# Patient Record
Sex: MALE | Race: OTHER | NOT HISPANIC OR LATINO | URBAN - METROPOLITAN AREA
[De-identification: names, ages, dates, MRNs, and addresses within clinical notes are randomized per-mention and may not be internally consistent; named-entity substitution may affect disease eponyms.]

---

## 2023-05-31 ENCOUNTER — OFFICE VISIT (OUTPATIENT)
Dept: PRIMARY CARE | Facility: CLINIC | Age: 63
End: 2023-05-31
Payer: COMMERCIAL

## 2023-05-31 VITALS
OXYGEN SATURATION: 97 % | DIASTOLIC BLOOD PRESSURE: 62 MMHG | RESPIRATION RATE: 18 BRPM | TEMPERATURE: 98.3 F | HEART RATE: 69 BPM | SYSTOLIC BLOOD PRESSURE: 108 MMHG | WEIGHT: 170 LBS | HEIGHT: 64 IN | BODY MASS INDEX: 29.02 KG/M2

## 2023-05-31 DIAGNOSIS — L72.0 INCLUSION CYST: ICD-10-CM

## 2023-05-31 DIAGNOSIS — Z12.5 PROSTATE CANCER SCREENING: ICD-10-CM

## 2023-05-31 DIAGNOSIS — F17.210 HEAVY CIGARETTE SMOKER: ICD-10-CM

## 2023-05-31 DIAGNOSIS — Z13.220 LIPID SCREENING: Primary | ICD-10-CM

## 2023-05-31 DIAGNOSIS — Z12.11 COLON CANCER SCREENING: ICD-10-CM

## 2023-05-31 DIAGNOSIS — Z00.00 HEALTH CARE MAINTENANCE: ICD-10-CM

## 2023-05-31 PROBLEM — Z20.822 SUSPECTED COVID-19 VIRUS INFECTION: Status: RESOLVED | Noted: 2023-05-31 | Resolved: 2023-05-31

## 2023-05-31 PROBLEM — M79.642 HAND PAIN, NOT ARTHRALGIA, LEFT: Status: RESOLVED | Noted: 2023-05-31 | Resolved: 2023-05-31

## 2023-05-31 PROBLEM — S62.307A FRACTURE OF FIFTH METACARPAL BONE OF LEFT HAND: Status: RESOLVED | Noted: 2023-05-31 | Resolved: 2023-05-31

## 2023-05-31 PROBLEM — R05.9 COUGH: Status: RESOLVED | Noted: 2023-05-31 | Resolved: 2023-05-31

## 2023-05-31 PROBLEM — B34.9 VIRAL ILLNESS: Status: RESOLVED | Noted: 2023-05-31 | Resolved: 2023-05-31

## 2023-05-31 PROCEDURE — 99204 OFFICE O/P NEW MOD 45 MIN: CPT | Performed by: FAMILY MEDICINE

## 2023-05-31 PROCEDURE — 10061 I&D ABSCESS COMP/MULTIPLE: CPT | Performed by: FAMILY MEDICINE

## 2023-05-31 RX ORDER — UMECLIDINIUM BROMIDE AND VILANTEROL TRIFENATATE 62.5; 25 UG/1; UG/1
1 POWDER RESPIRATORY (INHALATION) DAILY
COMMUNITY

## 2023-05-31 ASSESSMENT — ENCOUNTER SYMPTOMS
HEADACHES: 0
VOMITING: 0
PALPITATIONS: 0
DIZZINESS: 0
NERVOUS/ANXIOUS: 0
BACK PAIN: 0
CONSTIPATION: 0
MYALGIAS: 0
DIARRHEA: 0
ROS SKIN COMMENTS: SEE HPI
DYSPHORIC MOOD: 0
COUGH: 0
NAUSEA: 0
FEVER: 0
CHILLS: 0
SHORTNESS OF BREATH: 0
ARTHRALGIAS: 0
WEAKNESS: 0
ABDOMINAL PAIN: 0
FATIGUE: 0

## 2023-05-31 NOTE — PROGRESS NOTES
"Subjective   Patient ID: Leonid Molina is a 63 y.o. male who presents for New Patient Visit (New patient present today to establish. ).    Presents with wife to establish care, has not been receiving regular healthcare. Is on anoro inhaler for COPD. Is a pack a day smoker for 45 years, declines smoking cessation today. Has never had lung cancer screening but does wish to have this ordered. Denies any hempotysis, shortness of breath, cough, change in weight. Does not have any routine blood work on file. Is not up to date with routine health screenings. Does not have any known family history of colon cancer. Does have occasional blood in stool likely due to hemmerhoids but has not had any recently. Discussed colon cancer screening today and patient does agree to colonoscopy for screening purposes.     Does have concerns of a growth on chest that has been progressively getting larger over the last several years. Is nontender, dome shaped, mobile and consistency of inclusion cyst. Has attempted to drain lump but has been unsuccessful.             Review of Systems   Constitutional:  Negative for chills, fatigue and fever.   Eyes:  Negative for visual disturbance.   Respiratory:  Negative for cough and shortness of breath.    Cardiovascular:  Negative for chest pain and palpitations.   Gastrointestinal:  Negative for abdominal pain, constipation, diarrhea, nausea and vomiting.   Musculoskeletal:  Negative for arthralgias, back pain and myalgias.   Skin:  Negative for rash.        See HPI   Neurological:  Negative for dizziness, syncope, weakness and headaches.   Psychiatric/Behavioral:  Negative for dysphoric mood. The patient is not nervous/anxious.        Objective   /62   Pulse 69   Temp 36.8 °C (98.3 °F) (Temporal)   Resp 18   Ht 1.626 m (5' 4\")   Wt 77.1 kg (170 lb)   SpO2 97%   BMI 29.18 kg/m²     Physical Exam  Constitutional:       Appearance: Normal appearance.   HENT:      Head: Normocephalic " and atraumatic.   Cardiovascular:      Rate and Rhythm: Normal rate and regular rhythm.      Heart sounds: No murmur heard.     No gallop.   Pulmonary:      Effort: Pulmonary effort is normal. Prolonged expiration present. No respiratory distress.      Breath sounds: Examination of the right-middle field reveals wheezing. Examination of the left-middle field reveals wheezing. Examination of the right-lower field reveals wheezing. Examination of the left-lower field reveals wheezing. Wheezing present.   Abdominal:      General: Bowel sounds are normal. There is no distension.      Tenderness: There is no abdominal tenderness.   Musculoskeletal:         General: Normal range of motion.   Skin:     General: Skin is warm and dry.      Findings: No lesion or rash.             Comments: Approximately 2 inch in diameter inclusion cyst of mid chest   Neurological:      General: No focal deficit present.      Mental Status: He is alert and oriented to person, place, and time. Mental status is at baseline.   Psychiatric:         Mood and Affect: Mood normal.         Behavior: Behavior normal.   Patient ID: Leonid Molina is a 63 y.o. male.    Incision and Drainage    Date/Time: 5/31/2023 9:40 PM    Performed by: ZENAIDA Arango  Authorized by: ZENAIDA Arango    Consent:     Consent obtained:  Verbal    Consent given by:  Patient    Risks, benefits, and alternatives were discussed: yes      Risks discussed:  Bleeding, incomplete drainage, pain and infection    Alternatives discussed:  No treatment, observation and referral  Universal protocol:     Procedure explained and questions answered to patient or proxy's satisfaction: yes      Immediately prior to procedure, a time out was called: yes      Patient identity confirmed:  Verbally with patient  Location:     Type:  Cyst    Location: chest.  Pre-procedure details:     Skin preparation:  Povidone-iodine  Anesthesia:     Anesthesia method:   Topical application  Procedure type:     Complexity:  Complex  Procedure details:     Incision types:  Single straight    Incision depth:  Dermal    Wound management:  Probed and deloculated    Drainage characteristics: white curd-like drainage with serosanguinous fluid.    Drainage amount:  Moderate    Wound treatment:  Wound left open    Packing materials:  Wick placed  Post-procedure details:     Procedure completion:  Tolerated well, no immediate complications      Assessment/Plan   Problem List Items Addressed This Visit    None

## 2023-06-02 ENCOUNTER — OFFICE VISIT (OUTPATIENT)
Dept: PRIMARY CARE | Facility: CLINIC | Age: 63
End: 2023-06-02
Payer: COMMERCIAL

## 2023-06-02 VITALS
BODY MASS INDEX: 26.97 KG/M2 | SYSTOLIC BLOOD PRESSURE: 130 MMHG | HEIGHT: 66 IN | DIASTOLIC BLOOD PRESSURE: 70 MMHG | WEIGHT: 167.8 LBS | TEMPERATURE: 98.3 F | HEART RATE: 80 BPM | OXYGEN SATURATION: 97 % | RESPIRATION RATE: 17 BRPM

## 2023-06-02 DIAGNOSIS — Z51.89 ENCOUNTER FOR WOUND CARE: Primary | ICD-10-CM

## 2023-06-02 PROCEDURE — 99024 POSTOP FOLLOW-UP VISIT: CPT | Performed by: FAMILY MEDICINE

## 2023-06-02 ASSESSMENT — ENCOUNTER SYMPTOMS
ROS SKIN COMMENTS: SEE HPI
FEVER: 0
CHILLS: 0

## 2023-06-02 NOTE — PROGRESS NOTES
"Subjective   Patient ID: Leonid Molina is a 63 y.o. male who presents for Cyst (Packing removal).    I&D of inclusion cyst  two days ago requiring packing. Presents today for removal. Wound bed is healing well and is [id number], will repack today. No s/s infection on exam.         Review of Systems   Constitutional:  Negative for chills and fever.   Skin:         See HPI       Objective   /70   Pulse 80   Temp 36.8 °C (98.3 °F) (Temporal)   Resp 17   Ht 1.676 m (5' 6\")   Wt 76.1 kg (167 lb 12.8 oz)   SpO2 97%   BMI 27.08 kg/m²     Physical Exam  Constitutional:       Appearance: Normal appearance.   Skin:     Comments: Mid chest incision approximately [id number] in diameter, moist red wound bed with serosanguinous drainage.        Assessment/Plan          "

## 2023-06-07 ENCOUNTER — TELEPHONE (OUTPATIENT)
Dept: PRIMARY CARE | Facility: CLINIC | Age: 63
End: 2023-06-07
Payer: COMMERCIAL

## 2023-06-07 NOTE — TELEPHONE ENCOUNTER
Pt has had cramps and diarrhea, black in color, since Sun. Getting concerned because it is not letting up, thinks it may be food poisoning from a bad hamburger. Please advise.

## 2023-12-14 ENCOUNTER — HOSPITAL ENCOUNTER (OUTPATIENT)
Dept: RADIOLOGY | Facility: EXTERNAL LOCATION | Age: 63
Discharge: HOME | End: 2023-12-14

## 2023-12-14 DIAGNOSIS — M25.511 RIGHT SHOULDER PAIN, UNSPECIFIED CHRONICITY: ICD-10-CM
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
## 2023-12-14 DIAGNOSIS — M54.50 LOW BACK PAIN WITHOUT SCIATICA, UNSPECIFIED BACK PAIN LATERALITY, UNSPECIFIED CHRONICITY: ICD-10-CM

## 2023-12-20 ENCOUNTER — APPOINTMENT (OUTPATIENT)
Dept: RADIOLOGY | Facility: HOSPITAL | Age: 63
End: 2023-12-20
Payer: COMMERCIAL

## 2023-12-20 ENCOUNTER — HOSPITAL ENCOUNTER (EMERGENCY)
Facility: HOSPITAL | Age: 63
Discharge: HOME | End: 2023-12-20
Payer: COMMERCIAL

## 2023-12-20 VITALS
RESPIRATION RATE: 18 BRPM | OXYGEN SATURATION: 97 % | WEIGHT: 165 LBS | BODY MASS INDEX: 26.52 KG/M2 | SYSTOLIC BLOOD PRESSURE: 164 MMHG | HEART RATE: 81 BPM | TEMPERATURE: 98 F | DIASTOLIC BLOOD PRESSURE: 90 MMHG | HEIGHT: 66 IN

## 2023-12-20 DIAGNOSIS — R51.9 ACUTE NONINTRACTABLE HEADACHE, UNSPECIFIED HEADACHE TYPE: ICD-10-CM

## 2023-12-20 DIAGNOSIS — S16.1XXA CERVICAL STRAIN, ACUTE, INITIAL ENCOUNTER: Primary | ICD-10-CM

## 2023-12-20 DIAGNOSIS — S46.911A STRAIN OF RIGHT SHOULDER, INITIAL ENCOUNTER: ICD-10-CM

## 2023-12-20 PROCEDURE — 70450 CT HEAD/BRAIN W/O DYE: CPT

## 2023-12-20 PROCEDURE — 2500000004 HC RX 250 GENERAL PHARMACY W/ HCPCS (ALT 636 FOR OP/ED): Performed by: NURSE PRACTITIONER

## 2023-12-20 PROCEDURE — 72125 CT NECK SPINE W/O DYE: CPT

## 2023-12-20 PROCEDURE — 70450 CT HEAD/BRAIN W/O DYE: CPT | Performed by: RADIOLOGY

## 2023-12-20 PROCEDURE — 72125 CT NECK SPINE W/O DYE: CPT | Performed by: RADIOLOGY

## 2023-12-20 PROCEDURE — 99285 EMERGENCY DEPT VISIT HI MDM: CPT

## 2023-12-20 PROCEDURE — 96372 THER/PROPH/DIAG INJ SC/IM: CPT

## 2023-12-20 RX ORDER — NAPROXEN 500 MG/1
500 TABLET ORAL
Qty: 14 TABLET | Refills: 0 | Status: SHIPPED | OUTPATIENT
Start: 2023-12-20 | End: 2023-12-27

## 2023-12-20 RX ORDER — KETOROLAC TROMETHAMINE 30 MG/ML
30 INJECTION, SOLUTION INTRAMUSCULAR; INTRAVENOUS ONCE
Status: COMPLETED | OUTPATIENT
Start: 2023-12-20 | End: 2023-12-20

## 2023-12-20 RX ORDER — METHOCARBAMOL 500 MG/1
500 TABLET, FILM COATED ORAL 2 TIMES DAILY
Qty: 20 TABLET | Refills: 0 | Status: SHIPPED | OUTPATIENT
Start: 2023-12-20 | End: 2023-12-30

## 2023-12-20 RX ADMIN — KETOROLAC TROMETHAMINE 30 MG: 30 INJECTION, SOLUTION INTRAMUSCULAR at 12:12

## 2023-12-20 ASSESSMENT — COLUMBIA-SUICIDE SEVERITY RATING SCALE - C-SSRS
1. IN THE PAST MONTH, HAVE YOU WISHED YOU WERE DEAD OR WISHED YOU COULD GO TO SLEEP AND NOT WAKE UP?: NO
6. HAVE YOU EVER DONE ANYTHING, STARTED TO DO ANYTHING, OR PREPARED TO DO ANYTHING TO END YOUR LIFE?: NO
2. HAVE YOU ACTUALLY HAD ANY THOUGHTS OF KILLING YOURSELF?: NO

## 2023-12-20 ASSESSMENT — LIFESTYLE VARIABLES
EVER HAD A DRINK FIRST THING IN THE MORNING TO STEADY YOUR NERVES TO GET RID OF A HANGOVER: NO
HAVE YOU EVER FELT YOU SHOULD CUT DOWN ON YOUR DRINKING: NO
HAVE PEOPLE ANNOYED YOU BY CRITICIZING YOUR DRINKING: NO
EVER FELT BAD OR GUILTY ABOUT YOUR DRINKING: NO
REASON UNABLE TO ASSESS: NO
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
## 2023-12-22 ENCOUNTER — TELEPHONE (OUTPATIENT)
Dept: PRIMARY CARE | Facility: CLINIC | Age: 63
End: 2023-12-22
Payer: COMMERCIAL

## 2023-12-22 NOTE — TELEPHONE ENCOUNTER
Peggy Beltran, STEVE-LUAN Menezes MA  Caller: Unspecified (Today,  2:10 PM)  We are not in tomorrow and I do not have availability until next Friday, please see if he would like to schedule this.
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
## 2024-01-19 ENCOUNTER — OFFICE VISIT (OUTPATIENT)
Dept: PRIMARY CARE | Facility: CLINIC | Age: 64
End: 2024-01-19
Payer: COMMERCIAL

## 2024-01-19 VITALS
WEIGHT: 165 LBS | DIASTOLIC BLOOD PRESSURE: 82 MMHG | BODY MASS INDEX: 26.52 KG/M2 | HEIGHT: 66 IN | SYSTOLIC BLOOD PRESSURE: 132 MMHG

## 2024-01-19 DIAGNOSIS — N52.9 ERECTILE DYSFUNCTION, UNSPECIFIED ERECTILE DYSFUNCTION TYPE: ICD-10-CM

## 2024-01-19 DIAGNOSIS — S16.1XXS CERVICAL STRAIN, ACUTE, SEQUELA: Primary | ICD-10-CM

## 2024-01-19 DIAGNOSIS — M25.511 ACUTE PAIN OF RIGHT SHOULDER: ICD-10-CM
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
## 2024-01-19 DIAGNOSIS — S39.012S LUMBAR STRAIN, SEQUELA: ICD-10-CM

## 2024-01-19 PROBLEM — Z20.822 CONTACT WITH AND (SUSPECTED) EXPOSURE TO COVID-19: Status: RESOLVED | Noted: 2021-12-19 | Resolved: 2024-01-19

## 2024-01-19 PROBLEM — F17.200 NICOTINE DEPENDENCE: Status: RESOLVED | Noted: 2024-01-19 | Resolved: 2024-01-19

## 2024-01-19 PROBLEM — L72.0 EPIDERMOID CYST: Status: RESOLVED | Noted: 2024-01-19 | Resolved: 2024-01-19

## 2024-01-19 PROBLEM — R05.9 COUGH, UNSPECIFIED: Status: ACTIVE | Noted: 2021-12-19
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
## 2024-01-19 PROBLEM — M54.50 LOW BACK PAIN: Status: RESOLVED | Noted: 2023-12-14 | Resolved: 2024-01-19

## 2024-01-19 PROBLEM — S16.1XXA STRAIN OF NECK MUSCLE: Status: RESOLVED | Noted: 2024-01-19 | Resolved: 2024-01-19

## 2024-01-19 PROBLEM — R51.9 ACUTE HEADACHE: Status: RESOLVED | Noted: 2024-01-19 | Resolved: 2024-01-19

## 2024-01-19 PROBLEM — F17.210 HEAVY CIGARETTE SMOKER: Status: RESOLVED | Noted: 2020-11-03 | Resolved: 2024-01-19

## 2024-01-19 PROCEDURE — 99214 OFFICE O/P EST MOD 30 MIN: CPT | Performed by: FAMILY MEDICINE

## 2024-01-19 RX ORDER — SILDENAFIL 50 MG/1
50 TABLET, FILM COATED ORAL DAILY PRN
Qty: 12 TABLET | Refills: 3 | Status: SHIPPED | OUTPATIENT
Start: 2024-01-19 | End: 2025-01-18

## 2024-01-19 RX ORDER — PREDNISONE 20 MG/1
TABLET ORAL
Qty: 18 TABLET | Refills: 0 | Status: SHIPPED | OUTPATIENT
Start: 2024-01-19

## 2024-01-19 ASSESSMENT — ENCOUNTER SYMPTOMS
DEPRESSION: 0
OCCASIONAL FEELINGS OF UNSTEADINESS: 0
LOSS OF SENSATION IN FEET: 0

## 2024-01-19 ASSESSMENT — PATIENT HEALTH QUESTIONNAIRE - PHQ9
SUM OF ALL RESPONSES TO PHQ9 QUESTIONS 1 AND 2: 0
2. FEELING DOWN, DEPRESSED OR HOPELESS: NOT AT ALL
1. LITTLE INTEREST OR PLEASURE IN DOING THINGS: NOT AT ALL
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
## 2024-01-23 ENCOUNTER — APPOINTMENT (OUTPATIENT)
Dept: ORTHOPEDIC SURGERY | Facility: CLINIC | Age: 64
End: 2024-01-23
Payer: COMMERCIAL

## 2024-01-31 ENCOUNTER — OFFICE VISIT (OUTPATIENT)
Dept: ORTHOPEDIC SURGERY | Facility: CLINIC | Age: 64
End: 2024-01-31
Payer: COMMERCIAL

## 2024-01-31 VITALS — WEIGHT: 165 LBS | HEIGHT: 66 IN | BODY MASS INDEX: 26.52 KG/M2

## 2024-01-31 DIAGNOSIS — M75.81 ROTATOR CUFF TENDONITIS, RIGHT: ICD-10-CM

## 2024-01-31 DIAGNOSIS — M75.41 IMPINGEMENT SYNDROME OF RIGHT SHOULDER: ICD-10-CM

## 2024-01-31 DIAGNOSIS — M19.019 ACUTE DEGENERATIVE JOINT DISEASE OF SHOULDER REGION: Primary | ICD-10-CM

## 2024-01-31 DIAGNOSIS — M75.51 BURSITIS OF RIGHT SHOULDER: ICD-10-CM

## 2024-01-31 DIAGNOSIS — M25.511 ACUTE PAIN OF RIGHT SHOULDER: ICD-10-CM

## 2024-01-31 PROCEDURE — 99204 OFFICE O/P NEW MOD 45 MIN: CPT | Performed by: ORTHOPAEDIC SURGERY

## 2024-01-31 PROCEDURE — 20610 DRAIN/INJ JOINT/BURSA W/O US: CPT | Performed by: ORTHOPAEDIC SURGERY

## 2024-01-31 RX ORDER — LIDOCAINE HYDROCHLORIDE 10 MG/ML
1 INJECTION INFILTRATION; PERINEURAL
Status: COMPLETED | OUTPATIENT
Start: 2024-01-31 | End: 2024-01-31

## 2024-01-31 RX ORDER — TRIAMCINOLONE ACETONIDE 40 MG/ML
40 INJECTION, SUSPENSION INTRA-ARTICULAR; INTRAMUSCULAR
Status: COMPLETED | OUTPATIENT
Start: 2024-01-31 | End: 2024-01-31

## 2024-01-31 RX ORDER — MELOXICAM 15 MG/1
15 TABLET ORAL DAILY
Qty: 30 TABLET | Refills: 1 | Status: SHIPPED | OUTPATIENT
Start: 2024-01-31 | End: 2024-03-31

## 2024-01-31 RX ADMIN — LIDOCAINE HYDROCHLORIDE 1 ML: 10 INJECTION INFILTRATION; PERINEURAL at 14:49

## 2024-01-31 RX ADMIN — TRIAMCINOLONE ACETONIDE 40 MG: 40 INJECTION, SUSPENSION INTRA-ARTICULAR; INTRAMUSCULAR at 14:49
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
## 2024-03-26 ENCOUNTER — TELEPHONE (OUTPATIENT)
Dept: PRIMARY CARE | Facility: CLINIC | Age: 64
End: 2024-03-26
Payer: COMMERCIAL

## 2024-03-27 NOTE — TELEPHONE ENCOUNTER
There are no discount cards in the office. Notified patient, the patient tried good rx but it was still $500

## 2024-04-24 ENCOUNTER — OFFICE VISIT (OUTPATIENT)
Dept: ORTHOPEDIC SURGERY | Facility: CLINIC | Age: 64
End: 2024-04-24
Payer: COMMERCIAL

## 2024-04-24 VITALS — BODY MASS INDEX: 26.52 KG/M2 | HEIGHT: 66 IN | WEIGHT: 165 LBS

## 2024-04-24 DIAGNOSIS — M75.51 BURSITIS OF RIGHT SHOULDER: ICD-10-CM

## 2024-04-24 DIAGNOSIS — M75.81 ROTATOR CUFF TENDONITIS, RIGHT: Primary | ICD-10-CM

## 2024-04-24 DIAGNOSIS — M75.41 IMPINGEMENT SYNDROME OF RIGHT SHOULDER: ICD-10-CM

## 2024-04-24 PROCEDURE — 99214 OFFICE O/P EST MOD 30 MIN: CPT | Performed by: ORTHOPAEDIC SURGERY

## 2024-04-24 ASSESSMENT — PAIN - FUNCTIONAL ASSESSMENT: PAIN_FUNCTIONAL_ASSESSMENT: 0-10

## 2024-04-24 ASSESSMENT — PAIN SCALES - GENERAL: PAINLEVEL_OUTOF10: 4

## 2024-04-24 NOTE — PROGRESS NOTES
Follow up Right shoulder pain from 12/13/2023.  Last injected 01/31/2024.  He also completed 14 weeks of physical therapy which helped until he pulled the string on his  2 weeks ago and felt pain.  He now has pain again with movement.
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
## 2024-05-29 ENCOUNTER — APPOINTMENT (OUTPATIENT)
Dept: ORTHOPEDIC SURGERY | Facility: CLINIC | Age: 64
End: 2024-05-29
Payer: COMMERCIAL

## 2024-05-29 ENCOUNTER — OFFICE VISIT (OUTPATIENT)
Dept: ORTHOPEDIC SURGERY | Facility: CLINIC | Age: 64
End: 2024-05-29
Payer: COMMERCIAL

## 2024-05-29 VITALS — WEIGHT: 165 LBS | BODY MASS INDEX: 26.52 KG/M2 | HEIGHT: 66 IN

## 2024-05-29 DIAGNOSIS — M75.51 BURSITIS OF RIGHT SHOULDER: ICD-10-CM

## 2024-05-29 DIAGNOSIS — M75.81 ROTATOR CUFF TENDONITIS, RIGHT: Primary | ICD-10-CM

## 2024-05-29 DIAGNOSIS — M75.41 IMPINGEMENT SYNDROME OF RIGHT SHOULDER: ICD-10-CM

## 2024-05-29 PROCEDURE — 20610 DRAIN/INJ JOINT/BURSA W/O US: CPT | Performed by: ORTHOPAEDIC SURGERY

## 2024-05-29 PROCEDURE — 99214 OFFICE O/P EST MOD 30 MIN: CPT | Performed by: ORTHOPAEDIC SURGERY

## 2024-05-29 RX ORDER — LIDOCAINE HYDROCHLORIDE 10 MG/ML
4 INJECTION INFILTRATION; PERINEURAL
Status: COMPLETED | OUTPATIENT
Start: 2024-05-29 | End: 2024-05-29

## 2024-05-29 RX ORDER — TRIAMCINOLONE ACETONIDE 40 MG/ML
40 INJECTION, SUSPENSION INTRA-ARTICULAR; INTRAMUSCULAR
Status: COMPLETED | OUTPATIENT
Start: 2024-05-29 | End: 2024-05-29

## 2024-05-29 RX ADMIN — LIDOCAINE HYDROCHLORIDE 4 ML: 10 INJECTION INFILTRATION; PERINEURAL at 15:42

## 2024-05-29 RX ADMIN — TRIAMCINOLONE ACETONIDE 40 MG: 40 INJECTION, SUSPENSION INTRA-ARTICULAR; INTRAMUSCULAR at 15:42

## 2024-05-29 ASSESSMENT — PAIN - FUNCTIONAL ASSESSMENT: PAIN_FUNCTIONAL_ASSESSMENT: 0-10

## 2024-05-29 ASSESSMENT — PAIN SCALES - GENERAL: PAINLEVEL_OUTOF10: 5 - MODERATE PAIN
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
## 2024-12-09 ENCOUNTER — HOSPITAL ENCOUNTER (OUTPATIENT)
Dept: RADIOLOGY | Facility: CLINIC | Age: 64
Discharge: HOME | End: 2024-12-09
Payer: COMMERCIAL

## 2024-12-09 DIAGNOSIS — F17.218 NICOTINE DEPENDENCE, CIGARETTES, WITH OTHER NICOTINE-INDUCED DISORDERS: ICD-10-CM

## 2024-12-09 PROCEDURE — 71271 CT THORAX LUNG CANCER SCR C-: CPT | Performed by: RADIOLOGY

## 2024-12-09 PROCEDURE — 71271 CT THORAX LUNG CANCER SCR C-: CPT

## 2024-12-19 ENCOUNTER — HOSPITAL ENCOUNTER (OUTPATIENT)
Dept: RESPIRATORY THERAPY | Facility: HOSPITAL | Age: 64
Discharge: HOME | End: 2024-12-19
Payer: COMMERCIAL

## 2024-12-19 DIAGNOSIS — J43.2 CENTRILOBULAR EMPHYSEMA (MULTI): ICD-10-CM

## 2024-12-19 PROCEDURE — 94640 AIRWAY INHALATION TREATMENT: CPT

## 2024-12-19 PROCEDURE — 94726 PLETHYSMOGRAPHY LUNG VOLUMES: CPT

## 2024-12-19 PROCEDURE — 2500000001 HC RX 250 WO HCPCS SELF ADMINISTERED DRUGS (ALT 637 FOR MEDICARE OP): Performed by: INTERNAL MEDICINE

## 2024-12-19 RX ORDER — ALBUTEROL SULFATE 90 UG/1
4 INHALANT RESPIRATORY (INHALATION) ONCE
Status: COMPLETED | OUTPATIENT
Start: 2024-12-19 | End: 2024-12-19

## 2024-12-20 LAB
MGC ASCENT PFT - FEV1 - POST: 1.86
MGC ASCENT PFT - FEV1 - PRE: 1.85
MGC ASCENT PFT - FEV1 - PREDICTED: 2.79
MGC ASCENT PFT - FVC - POST: 4.1
MGC ASCENT PFT - FVC - PRE: 4.13
MGC ASCENT PFT - FVC - PREDICTED: 3.58

## 2025-01-16 ENCOUNTER — APPOINTMENT (OUTPATIENT)
Dept: PRIMARY CARE | Facility: CLINIC | Age: 65
End: 2025-01-16
Payer: COMMERCIAL

## 2025-01-16 VITALS
HEART RATE: 72 BPM | OXYGEN SATURATION: 98 % | DIASTOLIC BLOOD PRESSURE: 72 MMHG | BODY MASS INDEX: 26.2 KG/M2 | HEIGHT: 66 IN | WEIGHT: 163 LBS | SYSTOLIC BLOOD PRESSURE: 124 MMHG | RESPIRATION RATE: 16 BRPM

## 2025-01-16 DIAGNOSIS — Z13.1 SCREENING FOR DIABETES MELLITUS (DM): ICD-10-CM

## 2025-01-16 DIAGNOSIS — Z12.11 COLON CANCER SCREENING: ICD-10-CM

## 2025-01-16 DIAGNOSIS — N52.9 ERECTILE DYSFUNCTION, UNSPECIFIED ERECTILE DYSFUNCTION TYPE: ICD-10-CM

## 2025-01-16 DIAGNOSIS — J44.1 COPD WITH ACUTE EXACERBATION (MULTI): ICD-10-CM

## 2025-01-16 DIAGNOSIS — Z00.00 ROUTINE GENERAL MEDICAL EXAMINATION AT A HEALTH CARE FACILITY: Primary | ICD-10-CM

## 2025-01-16 PROCEDURE — 99397 PER PM REEVAL EST PAT 65+ YR: CPT | Performed by: FAMILY MEDICINE

## 2025-01-16 PROCEDURE — 1159F MED LIST DOCD IN RCRD: CPT | Performed by: FAMILY MEDICINE

## 2025-01-16 PROCEDURE — 3008F BODY MASS INDEX DOCD: CPT | Performed by: FAMILY MEDICINE

## 2025-01-16 PROCEDURE — 1160F RVW MEDS BY RX/DR IN RCRD: CPT | Performed by: FAMILY MEDICINE

## 2025-01-16 PROCEDURE — 4004F PT TOBACCO SCREEN RCVD TLK: CPT | Performed by: FAMILY MEDICINE

## 2025-01-16 RX ORDER — IPRATROPIUM BROMIDE AND ALBUTEROL SULFATE 2.5; .5 MG/3ML; MG/3ML
3 SOLUTION RESPIRATORY (INHALATION) 4 TIMES DAILY PRN
Qty: 75 ML | Refills: 3 | Status: SHIPPED | OUTPATIENT
Start: 2025-01-16 | End: 2026-01-16

## 2025-01-16 RX ORDER — ALBUTEROL SULFATE 90 UG/1
2 INHALANT RESPIRATORY (INHALATION) EVERY 4 HOURS PRN
Qty: 6.7 G | Refills: 5 | Status: SHIPPED | OUTPATIENT
Start: 2025-01-16 | End: 2026-01-16

## 2025-01-16 RX ORDER — SILDENAFIL 50 MG/1
50 TABLET, FILM COATED ORAL DAILY PRN
Qty: 12 TABLET | Refills: 3 | Status: CANCELLED | OUTPATIENT
Start: 2025-01-16 | End: 2026-01-16

## 2025-01-16 RX ORDER — SILDENAFIL 100 MG/1
100 TABLET, FILM COATED ORAL DAILY PRN
Qty: 30 TABLET | Refills: 5 | Status: SHIPPED | OUTPATIENT
Start: 2025-01-16 | End: 2026-01-16

## 2025-01-16 RX ORDER — DOXYCYCLINE 100 MG/1
100 CAPSULE ORAL 2 TIMES DAILY
Qty: 20 CAPSULE | Refills: 0 | Status: SHIPPED | OUTPATIENT
Start: 2025-01-16 | End: 2025-01-26

## 2025-01-16 RX ORDER — PROMETHAZINE HYDROCHLORIDE AND DEXTROMETHORPHAN HYDROBROMIDE 6.25; 15 MG/5ML; MG/5ML
5 SYRUP ORAL EVERY 4 HOURS PRN
Qty: 180 ML | Refills: 0 | Status: SHIPPED | OUTPATIENT
Start: 2025-01-16 | End: 2025-02-15

## 2025-01-16 RX ORDER — PREDNISONE 20 MG/1
TABLET ORAL
Qty: 18 TABLET | Refills: 0 | Status: SHIPPED | OUTPATIENT
Start: 2025-01-16

## 2025-01-16 ASSESSMENT — COLUMBIA-SUICIDE SEVERITY RATING SCALE - C-SSRS
2. HAVE YOU ACTUALLY HAD ANY THOUGHTS OF KILLING YOURSELF?: NO
6. HAVE YOU EVER DONE ANYTHING, STARTED TO DO ANYTHING, OR PREPARED TO DO ANYTHING TO END YOUR LIFE?: NO
1. IN THE PAST MONTH, HAVE YOU WISHED YOU WERE DEAD OR WISHED YOU COULD GO TO SLEEP AND NOT WAKE UP?: NO

## 2025-01-16 ASSESSMENT — ANXIETY QUESTIONNAIRES
3. WORRYING TOO MUCH ABOUT DIFFERENT THINGS: NOT AT ALL
GAD7 TOTAL SCORE: 0
1. FEELING NERVOUS, ANXIOUS, OR ON EDGE: NOT AT ALL
6. BECOMING EASILY ANNOYED OR IRRITABLE: NOT AT ALL
IF YOU CHECKED OFF ANY PROBLEMS ON THIS QUESTIONNAIRE, HOW DIFFICULT HAVE THESE PROBLEMS MADE IT FOR YOU TO DO YOUR WORK, TAKE CARE OF THINGS AT HOME, OR GET ALONG WITH OTHER PEOPLE: NOT DIFFICULT AT ALL
4. TROUBLE RELAXING: NOT AT ALL
7. FEELING AFRAID AS IF SOMETHING AWFUL MIGHT HAPPEN: NOT AT ALL
5. BEING SO RESTLESS THAT IT IS HARD TO SIT STILL: NOT AT ALL
2. NOT BEING ABLE TO STOP OR CONTROL WORRYING: NOT AT ALL

## 2025-01-16 ASSESSMENT — ENCOUNTER SYMPTOMS
BACK PAIN: 1
DEPRESSION: 0
ARTHRALGIAS: 1
OCCASIONAL FEELINGS OF UNSTEADINESS: 0
NECK PAIN: 1
LOSS OF SENSATION IN FEET: 0

## 2025-01-16 ASSESSMENT — PATIENT HEALTH QUESTIONNAIRE - PHQ9
2. FEELING DOWN, DEPRESSED OR HOPELESS: NOT AT ALL
1. LITTLE INTEREST OR PLEASURE IN DOING THINGS: NOT AT ALL
SUM OF ALL RESPONSES TO PHQ9 QUESTIONS 1 AND 2: 0

## 2025-01-16 NOTE — PATIENT INSTRUCTIONS
It was nice to meet you.     65 year old male to establish with me. He has COPD and has straight COPD exacerbation with wheezing and cough. Will refill Duoneb. Antibiotic, steroid, cough medication,.       Labwork ordered. I would get RSV Flu and Pneumonia.
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
## 2025-01-28 ENCOUNTER — APPOINTMENT (OUTPATIENT)
Dept: PRIMARY CARE | Facility: CLINIC | Age: 65
End: 2025-01-28
Payer: COMMERCIAL

## 2025-01-28 ENCOUNTER — OFFICE VISIT (OUTPATIENT)
Dept: PULMONOLOGY | Facility: HOSPITAL | Age: 65
End: 2025-01-28
Payer: COMMERCIAL

## 2025-01-28 VITALS
RESPIRATION RATE: 16 BRPM | TEMPERATURE: 97 F | SYSTOLIC BLOOD PRESSURE: 97 MMHG | WEIGHT: 162 LBS | OXYGEN SATURATION: 95 % | HEIGHT: 66 IN | DIASTOLIC BLOOD PRESSURE: 59 MMHG | HEART RATE: 73 BPM | BODY MASS INDEX: 26.03 KG/M2

## 2025-01-28 VITALS
BODY MASS INDEX: 26.03 KG/M2 | DIASTOLIC BLOOD PRESSURE: 74 MMHG | SYSTOLIC BLOOD PRESSURE: 122 MMHG | OXYGEN SATURATION: 98 % | HEART RATE: 84 BPM | RESPIRATION RATE: 16 BRPM | WEIGHT: 162 LBS | HEIGHT: 66 IN

## 2025-01-28 DIAGNOSIS — J44.1 COPD WITH ACUTE EXACERBATION (MULTI): Primary | ICD-10-CM

## 2025-01-28 DIAGNOSIS — F17.210 CIGARETTE SMOKER: ICD-10-CM

## 2025-01-28 DIAGNOSIS — N52.9 ERECTILE DYSFUNCTION, UNSPECIFIED ERECTILE DYSFUNCTION TYPE: ICD-10-CM

## 2025-01-28 DIAGNOSIS — R05.2 SUBACUTE COUGH: ICD-10-CM

## 2025-01-28 DIAGNOSIS — J44.9 CHRONIC OBSTRUCTIVE PULMONARY DISEASE, UNSPECIFIED COPD TYPE (MULTI): Primary | ICD-10-CM

## 2025-01-28 PROCEDURE — 4004F PT TOBACCO SCREEN RCVD TLK: CPT | Performed by: FAMILY MEDICINE

## 2025-01-28 PROCEDURE — 1159F MED LIST DOCD IN RCRD: CPT | Performed by: FAMILY MEDICINE

## 2025-01-28 PROCEDURE — 4004F PT TOBACCO SCREEN RCVD TLK: CPT | Performed by: NURSE PRACTITIONER

## 2025-01-28 PROCEDURE — 1160F RVW MEDS BY RX/DR IN RCRD: CPT | Performed by: NURSE PRACTITIONER

## 2025-01-28 PROCEDURE — 99203 OFFICE O/P NEW LOW 30 MIN: CPT | Performed by: NURSE PRACTITIONER

## 2025-01-28 PROCEDURE — 3008F BODY MASS INDEX DOCD: CPT | Performed by: FAMILY MEDICINE

## 2025-01-28 PROCEDURE — 3008F BODY MASS INDEX DOCD: CPT | Performed by: NURSE PRACTITIONER

## 2025-01-28 PROCEDURE — 99213 OFFICE O/P EST LOW 20 MIN: CPT | Performed by: NURSE PRACTITIONER

## 2025-01-28 PROCEDURE — 99213 OFFICE O/P EST LOW 20 MIN: CPT | Performed by: FAMILY MEDICINE

## 2025-01-28 PROCEDURE — 1159F MED LIST DOCD IN RCRD: CPT | Performed by: NURSE PRACTITIONER

## 2025-01-28 RX ORDER — SILDENAFIL 100 MG/1
100 TABLET, FILM COATED ORAL DAILY PRN
Qty: 30 TABLET | Refills: 5 | Status: SHIPPED | OUTPATIENT
Start: 2025-01-28 | End: 2026-01-28

## 2025-01-28 RX ORDER — PREDNISONE 10 MG/1
TABLET ORAL
Qty: 40 TABLET | Refills: 0 | Status: SHIPPED | OUTPATIENT
Start: 2025-01-28 | End: 2025-02-13

## 2025-01-28 RX ORDER — UMECLIDINIUM BROMIDE AND VILANTEROL TRIFENATATE 62.5; 25 UG/1; UG/1
1 POWDER RESPIRATORY (INHALATION) DAILY
Qty: 3 EACH | Refills: 3 | Status: SHIPPED | OUTPATIENT
Start: 2025-01-28

## 2025-01-28 RX ORDER — IPRATROPIUM BROMIDE AND ALBUTEROL SULFATE 2.5; .5 MG/3ML; MG/3ML
3 SOLUTION RESPIRATORY (INHALATION) 4 TIMES DAILY PRN
Qty: 75 ML | Refills: 5 | Status: SHIPPED | OUTPATIENT
Start: 2025-01-28 | End: 2026-01-28

## 2025-01-28 ASSESSMENT — ENCOUNTER SYMPTOMS
SHORTNESS OF BREATH: 1
OCCASIONAL FEELINGS OF UNSTEADINESS: 0
CHILLS: 0
COUGH: 1
SHORTNESS OF BREATH: 1
RHINORRHEA: 0
FATIGUE: 0
DEPRESSION: 0
LOSS OF SENSATION IN FEET: 0
WHEEZING: 1
FEVER: 0
WHEEZING: 1
COUGH: 1
UNEXPECTED WEIGHT CHANGE: 0

## 2025-01-28 ASSESSMENT — PATIENT HEALTH QUESTIONNAIRE - PHQ9
SUM OF ALL RESPONSES TO PHQ9 QUESTIONS 1 AND 2: 0
1. LITTLE INTEREST OR PLEASURE IN DOING THINGS: NOT AT ALL
2. FEELING DOWN, DEPRESSED OR HOPELESS: NOT AT ALL

## 2025-01-28 ASSESSMENT — ANXIETY QUESTIONNAIRES
1. FEELING NERVOUS, ANXIOUS, OR ON EDGE: NOT AT ALL
2. NOT BEING ABLE TO STOP OR CONTROL WORRYING: NOT AT ALL
6. BECOMING EASILY ANNOYED OR IRRITABLE: NOT AT ALL
3. WORRYING TOO MUCH ABOUT DIFFERENT THINGS: NOT AT ALL
IF YOU CHECKED OFF ANY PROBLEMS ON THIS QUESTIONNAIRE, HOW DIFFICULT HAVE THESE PROBLEMS MADE IT FOR YOU TO DO YOUR WORK, TAKE CARE OF THINGS AT HOME, OR GET ALONG WITH OTHER PEOPLE: NOT DIFFICULT AT ALL
GAD7 TOTAL SCORE: 0
7. FEELING AFRAID AS IF SOMETHING AWFUL MIGHT HAPPEN: NOT AT ALL
5. BEING SO RESTLESS THAT IT IS HARD TO SIT STILL: NOT AT ALL
4. TROUBLE RELAXING: NOT AT ALL

## 2025-01-28 ASSESSMENT — COLUMBIA-SUICIDE SEVERITY RATING SCALE - C-SSRS
2. HAVE YOU ACTUALLY HAD ANY THOUGHTS OF KILLING YOURSELF?: NO
1. IN THE PAST MONTH, HAVE YOU WISHED YOU WERE DEAD OR WISHED YOU COULD GO TO SLEEP AND NOT WAKE UP?: NO
6. HAVE YOU EVER DONE ANYTHING, STARTED TO DO ANYTHING, OR PREPARED TO DO ANYTHING TO END YOUR LIFE?: NO

## 2025-01-28 NOTE — PROGRESS NOTES
Subjective   Patient ID: Leonid Molina is a 65 y.o. male who presents for NPV for COPD.     HPI: Patient has PMH of COPD. He is here to establish with new pulmonary group. He is currently on Anoro and uses albuterol or nebulizer prn. He was recently treated for an acute exacerbation by his PCP on 1/16. He completed prednisone and antibiotics today. He states that he typically gets about one exacerbation per year. He is feeling significant improvement since being treated but still is wheezing. He had PFTs done here in December 2024. He denies sinus drainage and congestion. He states he was following annually with prior pulmonology group and does get yearly LDCT. He has never been on oxygen. He has not smoked in 2 weeks but prior has been smoking at 1 ppd x 45 years. He worked in a factory for 35 years.     Review of Systems   Constitutional:  Negative for chills, fatigue, fever and unexpected weight change.   HENT:  Negative for congestion, postnasal drip and rhinorrhea.    Respiratory:  Positive for cough (denies hemoptysis.), shortness of breath and wheezing.    Cardiovascular:  Negative for chest pain and leg swelling.   All other systems reviewed and are negative.      Objective   Physical Exam  Vitals reviewed.   Constitutional:       Appearance: Normal appearance.   HENT:      Head: Normocephalic.   Cardiovascular:      Rate and Rhythm: Normal rate and regular rhythm.   Pulmonary:      Effort: Pulmonary effort is normal.      Breath sounds: Wheezing present.   Skin:     General: Skin is warm and dry.   Neurological:      Mental Status: He is alert.         Assessment/Plan   COPD, with recent exacerbation  Nicotine dependence, in remission     Plan:    -PFTs done in December 2024 with moderate COPD. FEV1/FVC 45, FEV1 66, , DLCO 88%. Discussed results with patient at length.   -He was just treated with short course of prednisone and antibiotics by PCP on 1/16/25. He states that he has felt significant  improvement since taking, and completed course today. He is still wheezing significantly on exam today I suspect that he is going to need a prolonged prednisone taper. I sent this for him. He will notify me if any worsening of his symptoms and I will get a CXR.   -Continue on Anoro daily.  -Continue albuterol prn.  -Continue Duoneb prn.  -He was able to quit smoking 2 weeks ago when he was sick but prior he was smoking at 1 ppd x 45 years. Encouraged him to continue to abstain from smoking, he is motivated.   -LDCT was done 12/9/24 and showed a few small scattered nodules up to 5 mm, we will continue yearly screening for him December 2025, order placed today.     Overall we discussed testing results at length. I will extend his prednisone taper but will continue current regimen otherwise. He was following with prior pulmonologist annually he prefers to continue with this. I ordered next LDCT for December. I will see him back in one year but he is agreeable to call me if no improvement after extended prednisone or any worsening.     Total time:  40 min

## 2025-01-28 NOTE — PROGRESS NOTES
Subjective   Patient ID: Leonid Molina is a 65 y.o. male.    HEATHER Reynaga is here for follow up, He was very ill 1/16/2025 and we gave Duoneb, antibiotic steroid and Cough med. He is better and has quit smoking for 13 days,. Never missed work, and his shortness of breath is better.    Review of Systems   Respiratory:  Positive for cough, shortness of breath and wheezing.        Objective   Physical Exam  Vitals reviewed.   Pulmonary:      Breath sounds: Wheezing, rhonchi and rales present.      Comments: Right sided rales, crackles.         Assessment/Plan   Diagnoses and all orders for this visit:  COPD with acute exacerbation (Multi)  Erectile dysfunction, unspecified erectile dysfunction type  -     sildenafil (Viagra) 100 mg tablet; Take 1 tablet (100 mg) by mouth once daily as needed for erectile dysfunction.  Subacute cough      COPD exacerbation, resolving. Will see pulmonology today and may need CXR., I am so glad you quit smoking! Keep it up.

## 2025-01-28 NOTE — PATIENT INSTRUCTIONS
Please take prednisone taper until gone.  Continue on Anoro one puff once a day, everyday.  Continue albuterol and nebulizer as needed.   Please get CT scan of your chest in early December 2025.   Call with any with questions or concerns.  Follow up with me in one year.

## 2025-02-05 LAB — NONINV COLON CA DNA+OCC BLD SCRN STL QL: NEGATIVE

## 2025-03-04 ENCOUNTER — TELEPHONE (OUTPATIENT)
Dept: PRIMARY CARE | Facility: CLINIC | Age: 65
End: 2025-03-04
Payer: COMMERCIAL

## 2025-03-04 NOTE — TELEPHONE ENCOUNTER
Patients wife called and said patient still is not bot feeling better after the predisone and the symptoms are still persisting asking to be rechecked please advise     No openings until July     Patient also wanted to know the results of cologuard test please advise

## 2025-03-11 ENCOUNTER — APPOINTMENT (OUTPATIENT)
Dept: RADIOLOGY | Facility: HOSPITAL | Age: 65
End: 2025-03-11
Payer: COMMERCIAL

## 2025-03-11 ENCOUNTER — HOSPITAL ENCOUNTER (INPATIENT)
Facility: HOSPITAL | Age: 65
LOS: 2 days | Discharge: HOME | End: 2025-03-13
Attending: STUDENT IN AN ORGANIZED HEALTH CARE EDUCATION/TRAINING PROGRAM | Admitting: INTERNAL MEDICINE
Payer: COMMERCIAL

## 2025-03-11 ENCOUNTER — APPOINTMENT (OUTPATIENT)
Dept: CARDIOLOGY | Facility: HOSPITAL | Age: 65
End: 2025-03-11
Payer: COMMERCIAL

## 2025-03-11 DIAGNOSIS — J44.1 COPD EXACERBATION (MULTI): Primary | ICD-10-CM

## 2025-03-11 DIAGNOSIS — J44.1 COPD WITH ACUTE EXACERBATION (MULTI): ICD-10-CM

## 2025-03-11 LAB
ANION GAP SERPL CALC-SCNC: 14 MMOL/L (ref 10–20)
ATRIAL RATE: 101 BPM
BASE EXCESS BLDV CALC-SCNC: -0.2 MMOL/L (ref -2–3)
BASOPHILS # BLD AUTO: 0.03 X10*3/UL (ref 0–0.1)
BASOPHILS NFR BLD AUTO: 0.4 %
BNP SERPL-MCNC: 83 PG/ML (ref 0–99)
BODY TEMPERATURE: 37 DEGREES CELSIUS
BUN SERPL-MCNC: 16 MG/DL (ref 6–23)
CALCIUM SERPL-MCNC: 9.1 MG/DL (ref 8.6–10.3)
CARDIAC TROPONIN I PNL SERPL HS: 6 NG/L (ref 0–20)
CHLORIDE SERPL-SCNC: 105 MMOL/L (ref 98–107)
CO2 SERPL-SCNC: 24 MMOL/L (ref 21–32)
CREAT SERPL-MCNC: 0.95 MG/DL (ref 0.5–1.3)
CRP SERPL-MCNC: 1.74 MG/DL
EGFRCR SERPLBLD CKD-EPI 2021: 89 ML/MIN/1.73M*2
EOSINOPHIL # BLD AUTO: 0.21 X10*3/UL (ref 0–0.7)
EOSINOPHIL NFR BLD AUTO: 2.7 %
ERYTHROCYTE [DISTWIDTH] IN BLOOD BY AUTOMATED COUNT: 12.7 % (ref 11.5–14.5)
FLUAV RNA RESP QL NAA+PROBE: NOT DETECTED
FLUBV RNA RESP QL NAA+PROBE: NOT DETECTED
GLUCOSE SERPL-MCNC: 113 MG/DL (ref 74–99)
HCO3 BLDV-SCNC: 25.9 MMOL/L (ref 22–26)
HCT VFR BLD AUTO: 40.9 % (ref 41–52)
HGB BLD-MCNC: 14.1 G/DL (ref 13.5–17.5)
IMM GRANULOCYTES # BLD AUTO: 0.1 X10*3/UL (ref 0–0.7)
IMM GRANULOCYTES NFR BLD AUTO: 1.3 % (ref 0–0.9)
INHALED O2 CONCENTRATION: 28 %
LYMPHOCYTES # BLD AUTO: 2.06 X10*3/UL (ref 1.2–4.8)
LYMPHOCYTES NFR BLD AUTO: 26.6 %
MCH RBC QN AUTO: 33.3 PG (ref 26–34)
MCHC RBC AUTO-ENTMCNC: 34.5 G/DL (ref 32–36)
MCV RBC AUTO: 97 FL (ref 80–100)
MONOCYTES # BLD AUTO: 0.69 X10*3/UL (ref 0.1–1)
MONOCYTES NFR BLD AUTO: 8.9 %
NEUTROPHILS # BLD AUTO: 4.65 X10*3/UL (ref 1.2–7.7)
NEUTROPHILS NFR BLD AUTO: 60.1 %
NRBC BLD-RTO: 0 /100 WBCS (ref 0–0)
OXYHGB MFR BLDV: 89.3 % (ref 45–75)
P AXIS: 84 DEGREES
PCO2 BLDV: 47 MM HG (ref 41–51)
PH BLDV: 7.35 PH (ref 7.33–7.43)
PLATELET # BLD AUTO: 177 X10*3/UL (ref 150–450)
PO2 BLDV: 62 MM HG (ref 35–45)
POTASSIUM SERPL-SCNC: 4 MMOL/L (ref 3.5–5.3)
PR INTERVAL: 159 MS
Q ONSET: 251 MS
QRS COUNT: 16 BEATS
QRS DURATION: 90 MS
QT INTERVAL: 342 MS
QTC CALCULATION(BAZETT): 444 MS
QTC FREDERICIA: 406 MS
R AXIS: 68 DEGREES
RBC # BLD AUTO: 4.24 X10*6/UL (ref 4.5–5.9)
RSV RNA RESP QL NAA+PROBE: NOT DETECTED
SAO2 % BLDV: 91 % (ref 45–75)
SARS-COV-2 RNA RESP QL NAA+PROBE: NOT DETECTED
SODIUM SERPL-SCNC: 139 MMOL/L (ref 136–145)
T AXIS: 74 DEGREES
T OFFSET: 422 MS
VENTRICULAR RATE: 101 BPM
WBC # BLD AUTO: 7.7 X10*3/UL (ref 4.4–11.3)

## 2025-03-11 PROCEDURE — 71046 X-RAY EXAM CHEST 2 VIEWS: CPT | Performed by: RADIOLOGY

## 2025-03-11 PROCEDURE — 82374 ASSAY BLOOD CARBON DIOXIDE: CPT | Performed by: STUDENT IN AN ORGANIZED HEALTH CARE EDUCATION/TRAINING PROGRAM

## 2025-03-11 PROCEDURE — 2500000002 HC RX 250 W HCPCS SELF ADMINISTERED DRUGS (ALT 637 FOR MEDICARE OP, ALT 636 FOR OP/ED): Performed by: STUDENT IN AN ORGANIZED HEALTH CARE EDUCATION/TRAINING PROGRAM

## 2025-03-11 PROCEDURE — 83880 ASSAY OF NATRIURETIC PEPTIDE: CPT | Performed by: STUDENT IN AN ORGANIZED HEALTH CARE EDUCATION/TRAINING PROGRAM

## 2025-03-11 PROCEDURE — 1100000001 HC PRIVATE ROOM DAILY

## 2025-03-11 PROCEDURE — 2500000001 HC RX 250 WO HCPCS SELF ADMINISTERED DRUGS (ALT 637 FOR MEDICARE OP): Performed by: NURSE PRACTITIONER

## 2025-03-11 PROCEDURE — 86140 C-REACTIVE PROTEIN: CPT | Performed by: STUDENT IN AN ORGANIZED HEALTH CARE EDUCATION/TRAINING PROGRAM

## 2025-03-11 PROCEDURE — 71046 X-RAY EXAM CHEST 2 VIEWS: CPT

## 2025-03-11 PROCEDURE — 99285 EMERGENCY DEPT VISIT HI MDM: CPT | Mod: 25 | Performed by: STUDENT IN AN ORGANIZED HEALTH CARE EDUCATION/TRAINING PROGRAM

## 2025-03-11 PROCEDURE — 82805 BLOOD GASES W/O2 SATURATION: CPT | Performed by: STUDENT IN AN ORGANIZED HEALTH CARE EDUCATION/TRAINING PROGRAM

## 2025-03-11 PROCEDURE — 2500000002 HC RX 250 W HCPCS SELF ADMINISTERED DRUGS (ALT 637 FOR MEDICARE OP, ALT 636 FOR OP/ED): Performed by: NURSE PRACTITIONER

## 2025-03-11 PROCEDURE — 2500000004 HC RX 250 GENERAL PHARMACY W/ HCPCS (ALT 636 FOR OP/ED): Performed by: NURSE PRACTITIONER

## 2025-03-11 PROCEDURE — 96365 THER/PROPH/DIAG IV INF INIT: CPT | Mod: 59

## 2025-03-11 PROCEDURE — 36415 COLL VENOUS BLD VENIPUNCTURE: CPT | Performed by: STUDENT IN AN ORGANIZED HEALTH CARE EDUCATION/TRAINING PROGRAM

## 2025-03-11 PROCEDURE — 99222 1ST HOSP IP/OBS MODERATE 55: CPT | Performed by: NURSE PRACTITIONER

## 2025-03-11 PROCEDURE — 93005 ELECTROCARDIOGRAM TRACING: CPT

## 2025-03-11 PROCEDURE — 96375 TX/PRO/DX INJ NEW DRUG ADDON: CPT

## 2025-03-11 PROCEDURE — 96376 TX/PRO/DX INJ SAME DRUG ADON: CPT

## 2025-03-11 PROCEDURE — 2500000004 HC RX 250 GENERAL PHARMACY W/ HCPCS (ALT 636 FOR OP/ED): Performed by: STUDENT IN AN ORGANIZED HEALTH CARE EDUCATION/TRAINING PROGRAM

## 2025-03-11 PROCEDURE — 96372 THER/PROPH/DIAG INJ SC/IM: CPT | Performed by: NURSE PRACTITIONER

## 2025-03-11 PROCEDURE — 87637 SARSCOV2&INF A&B&RSV AMP PRB: CPT | Performed by: STUDENT IN AN ORGANIZED HEALTH CARE EDUCATION/TRAINING PROGRAM

## 2025-03-11 PROCEDURE — 85025 COMPLETE CBC W/AUTO DIFF WBC: CPT | Performed by: STUDENT IN AN ORGANIZED HEALTH CARE EDUCATION/TRAINING PROGRAM

## 2025-03-11 PROCEDURE — 94640 AIRWAY INHALATION TREATMENT: CPT

## 2025-03-11 PROCEDURE — 84484 ASSAY OF TROPONIN QUANT: CPT | Performed by: STUDENT IN AN ORGANIZED HEALTH CARE EDUCATION/TRAINING PROGRAM

## 2025-03-11 RX ORDER — IPRATROPIUM BROMIDE AND ALBUTEROL SULFATE 2.5; .5 MG/3ML; MG/3ML
3 SOLUTION RESPIRATORY (INHALATION) EVERY 20 MIN
Status: COMPLETED | OUTPATIENT
Start: 2025-03-11 | End: 2025-03-11

## 2025-03-11 RX ORDER — ENOXAPARIN SODIUM 100 MG/ML
40 INJECTION SUBCUTANEOUS EVERY 24 HOURS
Status: DISCONTINUED | OUTPATIENT
Start: 2025-03-11 | End: 2025-03-13 | Stop reason: HOSPADM

## 2025-03-11 RX ORDER — FORMOTEROL FUMARATE 20 UG/2ML
20 SOLUTION RESPIRATORY (INHALATION)
Status: DISCONTINUED | OUTPATIENT
Start: 2025-03-11 | End: 2025-03-13 | Stop reason: HOSPADM

## 2025-03-11 RX ORDER — GUAIFENESIN 600 MG/1
600 TABLET, EXTENDED RELEASE ORAL 2 TIMES DAILY PRN
Status: DISCONTINUED | OUTPATIENT
Start: 2025-03-11 | End: 2025-03-12

## 2025-03-11 RX ORDER — ACETAMINOPHEN 325 MG/1
650 TABLET ORAL EVERY 4 HOURS PRN
Status: DISCONTINUED | OUTPATIENT
Start: 2025-03-11 | End: 2025-03-13 | Stop reason: HOSPADM

## 2025-03-11 RX ORDER — IPRATROPIUM BROMIDE AND ALBUTEROL SULFATE 2.5; .5 MG/3ML; MG/3ML
3 SOLUTION RESPIRATORY (INHALATION)
Status: DISCONTINUED | OUTPATIENT
Start: 2025-03-11 | End: 2025-03-13 | Stop reason: HOSPADM

## 2025-03-11 RX ORDER — IPRATROPIUM BROMIDE AND ALBUTEROL SULFATE 2.5; .5 MG/3ML; MG/3ML
3 SOLUTION RESPIRATORY (INHALATION) EVERY 2 HOUR PRN
Status: DISCONTINUED | OUTPATIENT
Start: 2025-03-11 | End: 2025-03-13 | Stop reason: HOSPADM

## 2025-03-11 RX ORDER — SENNOSIDES 8.6 MG/1
2 TABLET ORAL 2 TIMES DAILY
Status: DISCONTINUED | OUTPATIENT
Start: 2025-03-11 | End: 2025-03-13 | Stop reason: HOSPADM

## 2025-03-11 RX ORDER — MIDAZOLAM HYDROCHLORIDE 1 MG/ML
2 INJECTION, SOLUTION INTRAMUSCULAR; INTRAVENOUS ONCE
Status: COMPLETED | OUTPATIENT
Start: 2025-03-11 | End: 2025-03-11

## 2025-03-11 RX ORDER — IPRATROPIUM BROMIDE AND ALBUTEROL SULFATE 2.5; .5 MG/3ML; MG/3ML
3 SOLUTION RESPIRATORY (INHALATION)
Status: DISCONTINUED | OUTPATIENT
Start: 2025-03-11 | End: 2025-03-11

## 2025-03-11 RX ADMIN — IPRATROPIUM BROMIDE AND ALBUTEROL SULFATE 3 ML: 2.5; .5 SOLUTION RESPIRATORY (INHALATION) at 05:37

## 2025-03-11 RX ADMIN — METHYLPREDNISOLONE SODIUM SUCCINATE 40 MG: 40 INJECTION, POWDER, FOR SOLUTION INTRAMUSCULAR; INTRAVENOUS at 10:26

## 2025-03-11 RX ADMIN — DOXYCYCLINE 100 MG: 100 INJECTION, POWDER, LYOPHILIZED, FOR SOLUTION INTRAVENOUS at 08:27

## 2025-03-11 RX ADMIN — METHYLPREDNISOLONE SODIUM SUCCINATE 40 MG: 40 INJECTION, POWDER, FOR SOLUTION INTRAMUSCULAR; INTRAVENOUS at 21:41

## 2025-03-11 RX ADMIN — DOXYCYCLINE 100 MG: 100 INJECTION, POWDER, LYOPHILIZED, FOR SOLUTION INTRAVENOUS at 21:41

## 2025-03-11 RX ADMIN — ENOXAPARIN SODIUM 40 MG: 100 INJECTION SUBCUTANEOUS at 08:27

## 2025-03-11 RX ADMIN — METHYLPREDNISOLONE SODIUM SUCCINATE 125 MG: 125 INJECTION, POWDER, FOR SOLUTION INTRAMUSCULAR; INTRAVENOUS at 05:24

## 2025-03-11 RX ADMIN — FORMOTEROL FUMARATE DIHYDRATE 20 MCG: 20 SOLUTION RESPIRATORY (INHALATION) at 10:26

## 2025-03-11 RX ADMIN — IPRATROPIUM BROMIDE AND ALBUTEROL SULFATE 3 ML: 2.5; .5 SOLUTION RESPIRATORY (INHALATION) at 05:41

## 2025-03-11 RX ADMIN — FORMOTEROL FUMARATE DIHYDRATE 20 MCG: 20 SOLUTION RESPIRATORY (INHALATION) at 20:26

## 2025-03-11 RX ADMIN — IPRATROPIUM BROMIDE AND ALBUTEROL SULFATE 3 ML: 2.5; .5 SOLUTION RESPIRATORY (INHALATION) at 20:27

## 2025-03-11 RX ADMIN — IPRATROPIUM BROMIDE AND ALBUTEROL SULFATE 3 ML: 2.5; .5 SOLUTION RESPIRATORY (INHALATION) at 10:26

## 2025-03-11 RX ADMIN — IPRATROPIUM BROMIDE AND ALBUTEROL SULFATE 3 ML: 2.5; .5 SOLUTION RESPIRATORY (INHALATION) at 05:23

## 2025-03-11 RX ADMIN — GUAIFENESIN 600 MG: 600 TABLET ORAL at 10:26

## 2025-03-11 RX ADMIN — IPRATROPIUM BROMIDE AND ALBUTEROL SULFATE 3 ML: 2.5; .5 SOLUTION RESPIRATORY (INHALATION) at 12:42

## 2025-03-11 RX ADMIN — TIOTROPIUM BROMIDE INHALATION SPRAY 2 PUFF: 3.12 SPRAY, METERED RESPIRATORY (INHALATION) at 10:26

## 2025-03-11 RX ADMIN — MIDAZOLAM 2 MG: 1 INJECTION INTRAMUSCULAR; INTRAVENOUS at 05:37

## 2025-03-11 SDOH — SOCIAL STABILITY: SOCIAL INSECURITY
WITHIN THE LAST YEAR, HAVE YOU BEEN KICKED, HIT, SLAPPED, OR OTHERWISE PHYSICALLY HURT BY YOUR PARTNER OR EX-PARTNER?: NO

## 2025-03-11 SDOH — ECONOMIC STABILITY: FOOD INSECURITY: WITHIN THE PAST 12 MONTHS, YOU WORRIED THAT YOUR FOOD WOULD RUN OUT BEFORE YOU GOT THE MONEY TO BUY MORE.: NEVER TRUE

## 2025-03-11 SDOH — ECONOMIC STABILITY: FOOD INSECURITY: WITHIN THE PAST 12 MONTHS, THE FOOD YOU BOUGHT JUST DIDN'T LAST AND YOU DIDN'T HAVE MONEY TO GET MORE.: NEVER TRUE

## 2025-03-11 SDOH — SOCIAL STABILITY: SOCIAL INSECURITY: WITHIN THE LAST YEAR, HAVE YOU BEEN AFRAID OF YOUR PARTNER OR EX-PARTNER?: NO

## 2025-03-11 SDOH — SOCIAL STABILITY: SOCIAL INSECURITY
WITHIN THE LAST YEAR, HAVE YOU BEEN RAPED OR FORCED TO HAVE ANY KIND OF SEXUAL ACTIVITY BY YOUR PARTNER OR EX-PARTNER?: NO

## 2025-03-11 SDOH — ECONOMIC STABILITY: INCOME INSECURITY: IN THE PAST 12 MONTHS HAS THE ELECTRIC, GAS, OIL, OR WATER COMPANY THREATENED TO SHUT OFF SERVICES IN YOUR HOME?: NO

## 2025-03-11 SDOH — SOCIAL STABILITY: SOCIAL INSECURITY: WITHIN THE LAST YEAR, HAVE YOU BEEN HUMILIATED OR EMOTIONALLY ABUSED IN OTHER WAYS BY YOUR PARTNER OR EX-PARTNER?: NO

## 2025-03-11 SDOH — SOCIAL STABILITY: SOCIAL INSECURITY: HAVE YOU HAD THOUGHTS OF HARMING ANYONE ELSE?: NO

## 2025-03-11 SDOH — SOCIAL STABILITY: SOCIAL INSECURITY: WERE YOU ABLE TO COMPLETE ALL THE BEHAVIORAL HEALTH SCREENINGS?: YES

## 2025-03-11 ASSESSMENT — ACTIVITIES OF DAILY LIVING (ADL)
JUDGMENT_ADEQUATE_SAFELY_COMPLETE_DAILY_ACTIVITIES: YES
WALKS IN HOME: INDEPENDENT
HEARING - LEFT EAR: FUNCTIONAL
LACK_OF_TRANSPORTATION: NO
DRESSING YOURSELF: INDEPENDENT
FEEDING YOURSELF: INDEPENDENT
HEARING - RIGHT EAR: FUNCTIONAL
LACK_OF_TRANSPORTATION: NO
HEARING - LEFT EAR: FUNCTIONAL
TOILETING: INDEPENDENT
FEEDING YOURSELF: INDEPENDENT
DRESSING YOURSELF: INDEPENDENT
GROOMING: INDEPENDENT
JUDGMENT_ADEQUATE_SAFELY_COMPLETE_DAILY_ACTIVITIES: YES
ADEQUATE_TO_COMPLETE_ADL: YES
HEARING - RIGHT EAR: FUNCTIONAL
BATHING: INDEPENDENT
PATIENT'S MEMORY ADEQUATE TO SAFELY COMPLETE DAILY ACTIVITIES?: YES
WALKS IN HOME: INDEPENDENT
TOILETING: INDEPENDENT
GROOMING: INDEPENDENT
PATIENT'S MEMORY ADEQUATE TO SAFELY COMPLETE DAILY ACTIVITIES?: YES
ADEQUATE_TO_COMPLETE_ADL: YES
BATHING: INDEPENDENT

## 2025-03-11 ASSESSMENT — COGNITIVE AND FUNCTIONAL STATUS - GENERAL
PATIENT BASELINE BEDBOUND: NO
PATIENT BASELINE BEDBOUND: NO
MOBILITY SCORE: 24
MOBILITY SCORE: 24
DAILY ACTIVITIY SCORE: 24
DAILY ACTIVITIY SCORE: 24
MOBILITY SCORE: 24

## 2025-03-11 ASSESSMENT — PAIN SCALES - GENERAL
PAINLEVEL_OUTOF10: 4
PAINLEVEL_OUTOF10: 0 - NO PAIN

## 2025-03-11 ASSESSMENT — PAIN SCALES - WONG BAKER: WONGBAKER_NUMERICALRESPONSE: NO HURT

## 2025-03-11 ASSESSMENT — LIFESTYLE VARIABLES
AUDIT-C TOTAL SCORE: 0
EVER HAD A DRINK FIRST THING IN THE MORNING TO STEADY YOUR NERVES TO GET RID OF A HANGOVER: NO
HOW OFTEN DO YOU HAVE 6 OR MORE DRINKS ON ONE OCCASION: NEVER
HAVE YOU EVER FELT YOU SHOULD CUT DOWN ON YOUR DRINKING: NO
HOW MANY STANDARD DRINKS CONTAINING ALCOHOL DO YOU HAVE ON A TYPICAL DAY: PATIENT DOES NOT DRINK
SKIP TO QUESTIONS 9-10: 1
PRESCIPTION_ABUSE_PAST_12_MONTHS: NO
AUDIT-C TOTAL SCORE: 0
EVER FELT BAD OR GUILTY ABOUT YOUR DRINKING: NO
SUBSTANCE_ABUSE_PAST_12_MONTHS: NO
HOW OFTEN DO YOU HAVE A DRINK CONTAINING ALCOHOL: NEVER
TOTAL SCORE: 0
HAVE PEOPLE ANNOYED YOU BY CRITICIZING YOUR DRINKING: NO

## 2025-03-11 ASSESSMENT — PATIENT HEALTH QUESTIONNAIRE - PHQ9
2. FEELING DOWN, DEPRESSED OR HOPELESS: NOT AT ALL
SUM OF ALL RESPONSES TO PHQ9 QUESTIONS 1 & 2: 0
1. LITTLE INTEREST OR PLEASURE IN DOING THINGS: NOT AT ALL

## 2025-03-11 ASSESSMENT — PAIN - FUNCTIONAL ASSESSMENT
PAIN_FUNCTIONAL_ASSESSMENT: 0-10
PAIN_FUNCTIONAL_ASSESSMENT: 0-10

## 2025-03-11 ASSESSMENT — COLUMBIA-SUICIDE SEVERITY RATING SCALE - C-SSRS
1. IN THE PAST MONTH, HAVE YOU WISHED YOU WERE DEAD OR WISHED YOU COULD GO TO SLEEP AND NOT WAKE UP?: NO
2. HAVE YOU ACTUALLY HAD ANY THOUGHTS OF KILLING YOURSELF?: NO
6. HAVE YOU EVER DONE ANYTHING, STARTED TO DO ANYTHING, OR PREPARED TO DO ANYTHING TO END YOUR LIFE?: NO

## 2025-03-11 NOTE — PROGRESS NOTES
Leonid Molina is a 65 y.o. male admitted for COPD exacerbation (Multi). Pharmacy reviewed the patient's [id number] medications and allergies for accuracy.    The list below reflects the PTA list prior to pharmacy medication history. A summary a changes to the PTA medication list has been listed below. Please review each medication in order reconciliation for additional clarification and justification.    Source of information: t2p     Medications added:    Medications modified:    Medications to be removed:  Robaxin 500mg     Medications of concern:      Prior to Admission Medications   Prescriptions Last Dose Informant Patient Reported? Taking?   Anoro Ellipta 62.5-25 mcg/actuation blister with device   No No   Sig: Inhale 1 puff once daily.   albuterol (Proventil HFA) 90 mcg/actuation inhaler   No No   Sig: Inhale 2 puffs every 4 hours if needed for wheezing or shortness of breath.   ipratropium-albuteroL (Duo-Neb) 0.5-2.5 mg/3 mL nebulizer solution   No No   Sig: Take 3 mL by nebulization 4 times a day as needed for wheezing or shortness of breath.   methocarbamol (Robaxin) 500 mg tablet   No No   Sig: Take 1 tablet (500 mg) by mouth 2 times a day for 10 days.   sildenafil (Viagra) 100 mg tablet   No No   Sig: Take 1 tablet (100 mg) by mouth once daily as needed for erectile dysfunction.      Facility-Administered Medications: None       KEYLA KEENE
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
## 2025-03-11 NOTE — CARE PLAN
The patient's goals for the shift include        Problem: Pain - Adult  Goal: Verbalizes/displays adequate comfort level or baseline comfort level  Outcome: Progressing     Problem: Safety - Adult  Goal: Free from fall injury  Outcome: Progressing     Problem: Discharge Planning  Goal: Discharge to home or other facility with appropriate resources  Outcome: Progressing     Problem: Chronic Conditions and Co-morbidities  Goal: Patient's chronic conditions and co-morbidity symptoms are monitored and maintained or improved  Outcome: Progressing     Problem: Nutrition  Goal: Nutrient intake appropriate for maintaining nutritional needs  Outcome: Progressing     Problem: Skin  Goal: Decreased wound size/increased tissue granulation at next dressing change  Outcome: Progressing  Flowsheets (Taken 3/11/2025 1723)  Decreased wound size/increased tissue granulation at next dressing change:   Promote sleep for wound healing   Protective dressings over bony prominences  Goal: Participates in plan/prevention/treatment measures  Outcome: Progressing  Flowsheets (Taken 3/11/2025 1723)  Participates in plan/prevention/treatment measures:   Discuss with provider PT/OT consult   Elevate heels  Goal: Prevent/manage excess moisture  Outcome: Progressing  Flowsheets (Taken 3/11/2025 1723)  Prevent/manage excess moisture:   Follow provider orders for dressing changes   Monitor for/manage infection if present  Goal: Prevent/minimize sheer/friction injuries  Outcome: Progressing  Flowsheets (Taken 3/11/2025 1723)  Prevent/minimize sheer/friction injuries:   HOB 30 degrees or less   Increase activity/out of bed for meals   Turn/reposition every 2 hours/use positioning/transfer devices   Use pull sheet  Goal: Promote/optimize nutrition  Outcome: Progressing  Flowsheets (Taken 3/11/2025 1723)  Promote/optimize nutrition:   Consume > 50% meals/supplements   Monitor/record intake including meals   Offer water/supplements/favorite foods  Goal:  Promote skin healing  Outcome: Progressing  Flowsheets (Taken 3/11/2025 1723)  Promote skin healing:   Ensure correct size (line/device) and apply per  instructions   Assess skin/pad under line(s)/device(s)   Protective dressings over bony prominences   Rotate device position/do not position patient on device   Turn/reposition every 2 hours/use positioning/transfer devices     Problem: Fall/Injury  Goal: Not fall by end of shift  Outcome: Progressing  Goal: Be free from injury by end of the shift  Outcome: Progressing  Goal: Verbalize understanding of personal risk factors for fall in the hospital  Outcome: Progressing  Goal: Verbalize understanding of risk factor reduction measures to prevent injury from fall in the home  Outcome: Progressing  Goal: Use assistive devices by end of the shift  Outcome: Progressing  Goal: Pace activities to prevent fatigue by end of the shift  Outcome: Progressing     Problem: Pain  Goal: Takes deep breaths with improved pain control throughout the shift  Outcome: Progressing  Goal: Turns in bed with improved pain control throughout the shift  Outcome: Progressing  Goal: Walks with improved pain control throughout the shift  Outcome: Progressing  Goal: Performs ADL's with improved pain control throughout shift  Outcome: Progressing  Goal: Participates in PT with improved pain control throughout the shift  Outcome: Progressing  Goal: Free from opioid side effects throughout the shift  Outcome: Progressing  Goal: Free from acute confusion related to pain meds throughout the shift  Outcome: Progressing     Problem: Respiratory  Goal: Clear secretions with interventions this shift  Outcome: Progressing  Goal: Minimize anxiety/maximize coping throughout shift  Outcome: Progressing  Goal: Minimal/no exertional discomfort or dyspnea this shift  Outcome: Progressing  Goal: No signs of respiratory distress (eg. Use of accessory muscles. Peds grunting)  Outcome: Progressing  Goal:  Patent airway maintained this shift  Outcome: Progressing

## 2025-03-11 NOTE — LETTER
March 13, 2025     Patient: Leonid Molina   YOB: 1960   Date of Visit: 3/11/2025       To Whom It May Concern:    Leonid Molina was hospitalized and will be discharged today. He is cleared to return to work on 3/17/25.   If you have any questions or concerns, please don't hesitate to call.         Sincerely,       Nadege Kirby, CNP

## 2025-03-12 LAB
ANION GAP SERPL CALC-SCNC: <7 MMOL/L (ref 10–20)
BUN SERPL-MCNC: 18 MG/DL (ref 6–23)
CALCIUM SERPL-MCNC: 9.4 MG/DL (ref 8.6–10.3)
CHLORIDE SERPL-SCNC: 108 MMOL/L (ref 98–107)
CO2 SERPL-SCNC: 24 MMOL/L (ref 21–32)
CREAT SERPL-MCNC: 0.83 MG/DL (ref 0.5–1.3)
EGFRCR SERPLBLD CKD-EPI 2021: >90 ML/MIN/1.73M*2
ERYTHROCYTE [DISTWIDTH] IN BLOOD BY AUTOMATED COUNT: 12.8 % (ref 11.5–14.5)
GLUCOSE SERPL-MCNC: 146 MG/DL (ref 74–99)
HCT VFR BLD AUTO: 38.6 % (ref 41–52)
HGB BLD-MCNC: 13.3 G/DL (ref 13.5–17.5)
MCH RBC QN AUTO: 33.3 PG (ref 26–34)
MCHC RBC AUTO-ENTMCNC: 34.5 G/DL (ref 32–36)
MCV RBC AUTO: 97 FL (ref 80–100)
NRBC BLD-RTO: 0 /100 WBCS (ref 0–0)
PLATELET # BLD AUTO: 211 X10*3/UL (ref 150–450)
POTASSIUM SERPL-SCNC: 4.4 MMOL/L (ref 3.5–5.3)
RBC # BLD AUTO: 4 X10*6/UL (ref 4.5–5.9)
SODIUM SERPL-SCNC: 134 MMOL/L (ref 136–145)
WBC # BLD AUTO: 15.7 X10*3/UL (ref 4.4–11.3)

## 2025-03-12 PROCEDURE — 2500000004 HC RX 250 GENERAL PHARMACY W/ HCPCS (ALT 636 FOR OP/ED): Mod: JZ | Performed by: NURSE PRACTITIONER

## 2025-03-12 PROCEDURE — 85027 COMPLETE CBC AUTOMATED: CPT | Performed by: NURSE PRACTITIONER

## 2025-03-12 PROCEDURE — 2500000002 HC RX 250 W HCPCS SELF ADMINISTERED DRUGS (ALT 637 FOR MEDICARE OP, ALT 636 FOR OP/ED): Performed by: NURSE PRACTITIONER

## 2025-03-12 PROCEDURE — 36415 COLL VENOUS BLD VENIPUNCTURE: CPT | Performed by: NURSE PRACTITIONER

## 2025-03-12 PROCEDURE — 2500000001 HC RX 250 WO HCPCS SELF ADMINISTERED DRUGS (ALT 637 FOR MEDICARE OP): Performed by: NURSE PRACTITIONER

## 2025-03-12 PROCEDURE — 80048 BASIC METABOLIC PNL TOTAL CA: CPT | Performed by: NURSE PRACTITIONER

## 2025-03-12 PROCEDURE — 94640 AIRWAY INHALATION TREATMENT: CPT

## 2025-03-12 PROCEDURE — 99232 SBSQ HOSP IP/OBS MODERATE 35: CPT | Performed by: NURSE PRACTITIONER

## 2025-03-12 PROCEDURE — 1100000001 HC PRIVATE ROOM DAILY

## 2025-03-12 RX ORDER — GUAIFENESIN 600 MG/1
600 TABLET, EXTENDED RELEASE ORAL 2 TIMES DAILY
Status: DISCONTINUED | OUTPATIENT
Start: 2025-03-12 | End: 2025-03-13 | Stop reason: HOSPADM

## 2025-03-12 RX ADMIN — FORMOTEROL FUMARATE DIHYDRATE 20 MCG: 20 SOLUTION RESPIRATORY (INHALATION) at 08:09

## 2025-03-12 RX ADMIN — IPRATROPIUM BROMIDE AND ALBUTEROL SULFATE 3 ML: 2.5; .5 SOLUTION RESPIRATORY (INHALATION) at 08:07

## 2025-03-12 RX ADMIN — METHYLPREDNISOLONE SODIUM SUCCINATE 40 MG: 40 INJECTION, POWDER, FOR SOLUTION INTRAMUSCULAR; INTRAVENOUS at 20:57

## 2025-03-12 RX ADMIN — FORMOTEROL FUMARATE DIHYDRATE 20 MCG: 20 SOLUTION RESPIRATORY (INHALATION) at 19:34

## 2025-03-12 RX ADMIN — METHYLPREDNISOLONE SODIUM SUCCINATE 40 MG: 40 INJECTION, POWDER, FOR SOLUTION INTRAMUSCULAR; INTRAVENOUS at 08:30

## 2025-03-12 RX ADMIN — GUAIFENESIN 600 MG: 600 TABLET ORAL at 20:57

## 2025-03-12 RX ADMIN — GUAIFENESIN 600 MG: 600 TABLET ORAL at 05:30

## 2025-03-12 RX ADMIN — IPRATROPIUM BROMIDE AND ALBUTEROL SULFATE 3 ML: 2.5; .5 SOLUTION RESPIRATORY (INHALATION) at 19:34

## 2025-03-12 RX ADMIN — SENNOSIDES 17.2 MG: 8.6 TABLET, FILM COATED ORAL at 08:30

## 2025-03-12 RX ADMIN — SENNOSIDES 17.2 MG: 8.6 TABLET, FILM COATED ORAL at 20:57

## 2025-03-12 RX ADMIN — DOXYCYCLINE 100 MG: 100 INJECTION, POWDER, LYOPHILIZED, FOR SOLUTION INTRAVENOUS at 20:57

## 2025-03-12 RX ADMIN — ENOXAPARIN SODIUM 40 MG: 100 INJECTION SUBCUTANEOUS at 08:30

## 2025-03-12 RX ADMIN — DOXYCYCLINE 100 MG: 100 INJECTION, POWDER, LYOPHILIZED, FOR SOLUTION INTRAVENOUS at 08:30

## 2025-03-12 ASSESSMENT — COGNITIVE AND FUNCTIONAL STATUS - GENERAL
DAILY ACTIVITIY SCORE: 24
MOBILITY SCORE: 24

## 2025-03-12 ASSESSMENT — PAIN SCALES - GENERAL: PAINLEVEL_OUTOF10: 0 - NO PAIN

## 2025-03-12 NOTE — CARE PLAN
The patient's goals for the shift include      The clinical goals for the shift include Pt will remain free of injury    Over the shift, the patient did not make progress toward the following goals. Barriers to progression include . Recommendations to address these barriers include .

## 2025-03-12 NOTE — CARE PLAN
Problem: Pain - Adult  Goal: Verbalizes/displays adequate comfort level or baseline comfort level  Outcome: Progressing     Problem: Safety - Adult  Goal: Free from fall injury  Outcome: Progressing     Problem: Discharge Planning  Goal: Discharge to home or other facility with appropriate resources  Outcome: Progressing     Problem: Chronic Conditions and Co-morbidities  Goal: Patient's chronic conditions and co-morbidity symptoms are monitored and maintained or improved  Outcome: Progressing     Problem: Nutrition  Goal: Nutrient intake appropriate for maintaining nutritional needs  Outcome: Progressing     Problem: Skin  Goal: Decreased wound size/increased tissue granulation at next dressing change  Outcome: Progressing  Goal: Participates in plan/prevention/treatment measures  Outcome: Progressing  Goal: Prevent/manage excess moisture  Outcome: Progressing  Goal: Prevent/minimize sheer/friction injuries  Outcome: Progressing  Goal: Promote/optimize nutrition  Outcome: Progressing  Goal: Promote skin healing  Outcome: Progressing     Problem: Fall/Injury  Goal: Not fall by end of shift  Outcome: Progressing  Goal: Be free from injury by end of the shift  Outcome: Progressing  Goal: Verbalize understanding of personal risk factors for fall in the hospital  Outcome: Progressing  Goal: Verbalize understanding of risk factor reduction measures to prevent injury from fall in the home  Outcome: Progressing  Goal: Use assistive devices by end of the shift  Outcome: Progressing  Goal: Pace activities to prevent fatigue by end of the shift  Outcome: Progressing     Problem: Pain  Goal: Takes deep breaths with improved pain control throughout the shift  Outcome: Progressing  Goal: Turns in bed with improved pain control throughout the shift  Outcome: Progressing  Goal: Walks with improved pain control throughout the shift  Outcome: Progressing  Goal: Performs ADL's with improved pain control throughout shift  Outcome:  Progressing  Goal: Participates in PT with improved pain control throughout the shift  Outcome: Progressing  Goal: Free from opioid side effects throughout the shift  Outcome: Progressing  Goal: Free from acute confusion related to pain meds throughout the shift  Outcome: Progressing     Problem: Respiratory  Goal: Clear secretions with interventions this shift  Outcome: Progressing  Goal: Minimize anxiety/maximize coping throughout shift  Outcome: Progressing  Goal: Minimal/no exertional discomfort or dyspnea this shift  Outcome: Progressing  Goal: No signs of respiratory distress (eg. Use of accessory muscles. Peds grunting)  Outcome: Progressing  Goal: Patent airway maintained this shift  Outcome: Progressing   The patient's goals for the shift include  ,Pt will remain free of injury    The clinical goals for the shift include Patient will maintain oxygen levels 92% or above on room air throughout shift
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
## 2025-03-12 NOTE — PROGRESS NOTES
03/12/25 1552   Discharge Planning   Living Arrangements Spouse/significant other   Support Systems Spouse/significant other   Assistance Needed none   Type of Residence Private residence   Home or Post Acute Services None   Expected Discharge Disposition Home   Does the patient need discharge transport arranged? No   Intensity of Service   Intensity of Service 0-30 min     Patient independent from home with spouse. He is able to care for self at baseline. He is admitted for COPD/SIRS. He will discharge home when medically ready. PCP is Shannan MORA to follow if needs arise.

## 2025-03-12 NOTE — CARE PLAN
Problem: Pain - Adult  Goal: Verbalizes/displays adequate comfort level or baseline comfort level  Outcome: Progressing     Problem: Safety - Adult  Goal: Free from fall injury  Outcome: Progressing     Problem: Discharge Planning  Goal: Discharge to home or other facility with appropriate resources  Outcome: Progressing     Problem: Chronic Conditions and Co-morbidities  Goal: Patient's chronic conditions and co-morbidity symptoms are monitored and maintained or improved  Outcome: Progressing     Problem: Nutrition  Goal: Nutrient intake appropriate for maintaining nutritional needs  Outcome: Progressing     Problem: Skin  Goal: Decreased wound size/increased tissue granulation at next dressing change  Outcome: Progressing  Goal: Participates in plan/prevention/treatment measures  Outcome: Progressing  Goal: Prevent/manage excess moisture  Outcome: Progressing  Goal: Prevent/minimize sheer/friction injuries  Outcome: Progressing  Goal: Promote/optimize nutrition  Outcome: Progressing  Goal: Promote skin healing  Outcome: Progressing     Problem: Fall/Injury  Goal: Not fall by end of shift  Outcome: Progressing  Goal: Be free from injury by end of the shift  Outcome: Progressing  Goal: Verbalize understanding of personal risk factors for fall in the hospital  Outcome: Progressing  Goal: Verbalize understanding of risk factor reduction measures to prevent injury from fall in the home  Outcome: Progressing  Goal: Use assistive devices by end of the shift  Outcome: Progressing  Goal: Pace activities to prevent fatigue by end of the shift  Outcome: Progressing     Problem: Pain  Goal: Takes deep breaths with improved pain control throughout the shift  Outcome: Progressing  Goal: Turns in bed with improved pain control throughout the shift  Outcome: Progressing  Goal: Walks with improved pain control throughout the shift  Outcome: Progressing  Goal: Performs ADL's with improved pain control throughout shift  Outcome:  Progressing  Goal: Participates in PT with improved pain control throughout the shift  Outcome: Progressing  Goal: Free from opioid side effects throughout the shift  Outcome: Progressing  Goal: Free from acute confusion related to pain meds throughout the shift  Outcome: Progressing     Problem: Respiratory  Goal: Clear secretions with interventions this shift  Outcome: Progressing  Goal: Minimize anxiety/maximize coping throughout shift  Outcome: Progressing  Goal: Minimal/no exertional discomfort or dyspnea this shift  Outcome: Progressing  Goal: No signs of respiratory distress (eg. Use of accessory muscles. Peds grunting)  Outcome: Progressing  Goal: Patent airway maintained this shift  Outcome: Progressing   The patient's goals for the shift include  Pt will remain free of injury    The clinical goals for the shift include pt remain free from falls and injuries, vitals remain stable, control pain, participate in pressure redistribution, spo2 > 90%

## 2025-03-13 ENCOUNTER — PHARMACY VISIT (OUTPATIENT)
Dept: PHARMACY | Facility: CLINIC | Age: 65
End: 2025-03-13
Payer: COMMERCIAL

## 2025-03-13 VITALS
BODY MASS INDEX: 26.52 KG/M2 | HEART RATE: 84 BPM | HEIGHT: 66 IN | OXYGEN SATURATION: 93 % | SYSTOLIC BLOOD PRESSURE: 120 MMHG | RESPIRATION RATE: 16 BRPM | WEIGHT: 165 LBS | TEMPERATURE: 98.5 F | DIASTOLIC BLOOD PRESSURE: 73 MMHG

## 2025-03-13 LAB
ANION GAP SERPL CALC-SCNC: 16 MMOL/L (ref 10–20)
BUN SERPL-MCNC: 24 MG/DL (ref 6–23)
CALCIUM SERPL-MCNC: 9.6 MG/DL (ref 8.6–10.3)
CHLORIDE SERPL-SCNC: 104 MMOL/L (ref 98–107)
CO2 SERPL-SCNC: 21 MMOL/L (ref 21–32)
CREAT SERPL-MCNC: 0.88 MG/DL (ref 0.5–1.3)
CRP SERPL-MCNC: 0.43 MG/DL
EGFRCR SERPLBLD CKD-EPI 2021: >90 ML/MIN/1.73M*2
ERYTHROCYTE [DISTWIDTH] IN BLOOD BY AUTOMATED COUNT: 13 % (ref 11.5–14.5)
GLUCOSE SERPL-MCNC: 147 MG/DL (ref 74–99)
HCT VFR BLD AUTO: 41.6 % (ref 41–52)
HGB BLD-MCNC: 13.9 G/DL (ref 13.5–17.5)
MCH RBC QN AUTO: 32.6 PG (ref 26–34)
MCHC RBC AUTO-ENTMCNC: 33.4 G/DL (ref 32–36)
MCV RBC AUTO: 98 FL (ref 80–100)
NRBC BLD-RTO: 0 /100 WBCS (ref 0–0)
PLATELET # BLD AUTO: 251 X10*3/UL (ref 150–450)
POTASSIUM SERPL-SCNC: 4.1 MMOL/L (ref 3.5–5.3)
RBC # BLD AUTO: 4.26 X10*6/UL (ref 4.5–5.9)
SODIUM SERPL-SCNC: 137 MMOL/L (ref 136–145)
WBC # BLD AUTO: 16.8 X10*3/UL (ref 4.4–11.3)

## 2025-03-13 PROCEDURE — 85027 COMPLETE CBC AUTOMATED: CPT | Performed by: NURSE PRACTITIONER

## 2025-03-13 PROCEDURE — 36415 COLL VENOUS BLD VENIPUNCTURE: CPT | Performed by: NURSE PRACTITIONER

## 2025-03-13 PROCEDURE — 2500000004 HC RX 250 GENERAL PHARMACY W/ HCPCS (ALT 636 FOR OP/ED): Performed by: NURSE PRACTITIONER

## 2025-03-13 PROCEDURE — 99238 HOSP IP/OBS DSCHRG MGMT 30/<: CPT | Performed by: NURSE PRACTITIONER

## 2025-03-13 PROCEDURE — 86140 C-REACTIVE PROTEIN: CPT | Performed by: NURSE PRACTITIONER

## 2025-03-13 PROCEDURE — 80048 BASIC METABOLIC PNL TOTAL CA: CPT | Performed by: NURSE PRACTITIONER

## 2025-03-13 PROCEDURE — RXMED WILLOW AMBULATORY MEDICATION CHARGE

## 2025-03-13 RX ORDER — PREDNISONE 10 MG/1
TABLET ORAL
Qty: 30 TABLET | Refills: 0 | Status: SHIPPED | OUTPATIENT
Start: 2025-03-13 | End: 2025-03-23

## 2025-03-13 RX ORDER — DOXYCYCLINE 100 MG/1
100 CAPSULE ORAL EVERY 12 HOURS
Qty: 13 CAPSULE | Refills: 0 | Status: SHIPPED | OUTPATIENT
Start: 2025-03-13 | End: 2025-03-20

## 2025-03-13 RX ORDER — GUAIFENESIN 600 MG/1
600 TABLET, EXTENDED RELEASE ORAL 2 TIMES DAILY PRN
Qty: 15 TABLET | Refills: 0 | Status: SHIPPED | OUTPATIENT
Start: 2025-03-13 | End: 2025-03-18 | Stop reason: SDUPTHER

## 2025-03-13 RX ADMIN — DOXYCYCLINE 100 MG: 100 INJECTION, POWDER, LYOPHILIZED, FOR SOLUTION INTRAVENOUS at 08:00

## 2025-03-13 RX ADMIN — GUAIFENESIN 600 MG: 600 TABLET ORAL at 08:00

## 2025-03-13 RX ADMIN — METHYLPREDNISOLONE SODIUM SUCCINATE 40 MG: 40 INJECTION, POWDER, FOR SOLUTION INTRAMUSCULAR; INTRAVENOUS at 08:00

## 2025-03-13 RX ADMIN — ENOXAPARIN SODIUM 40 MG: 100 INJECTION SUBCUTANEOUS at 08:00

## 2025-03-13 ASSESSMENT — PAIN - FUNCTIONAL ASSESSMENT: PAIN_FUNCTIONAL_ASSESSMENT: 0-10

## 2025-03-13 ASSESSMENT — COGNITIVE AND FUNCTIONAL STATUS - GENERAL
DAILY ACTIVITIY SCORE: 24
MOBILITY SCORE: 24

## 2025-03-13 ASSESSMENT — PAIN SCALES - WONG BAKER: WONGBAKER_NUMERICALRESPONSE: NO HURT

## 2025-03-13 ASSESSMENT — PAIN SCALES - GENERAL: PAINLEVEL_OUTOF10: 0 - NO PAIN

## 2025-03-13 NOTE — CARE PLAN
Problem: Pain - Adult  Goal: Verbalizes/displays adequate comfort level or baseline comfort level  Outcome: Progressing     Problem: Safety - Adult  Goal: Free from fall injury  Outcome: Progressing     Problem: Discharge Planning  Goal: Discharge to home or other facility with appropriate resources  Outcome: Progressing     Problem: Chronic Conditions and Co-morbidities  Goal: Patient's chronic conditions and co-morbidity symptoms are monitored and maintained or improved  Outcome: Progressing     Problem: Nutrition  Goal: Nutrient intake appropriate for maintaining nutritional needs  Outcome: Progressing     Problem: Skin  Goal: Decreased wound size/increased tissue granulation at next dressing change  Outcome: Progressing  Flowsheets (Taken 3/13/2025 0742)  Decreased wound size/increased tissue granulation at next dressing change:   Promote sleep for wound healing   Protective dressings over bony prominences  Goal: Participates in plan/prevention/treatment measures  Outcome: Progressing  Flowsheets (Taken 3/13/2025 0742)  Participates in plan/prevention/treatment measures:   Discuss with provider PT/OT consult   Elevate heels   Increase activity/out of bed for meals  Goal: Prevent/manage excess moisture  Outcome: Progressing  Flowsheets (Taken 3/13/2025 0742)  Prevent/manage excess moisture:   Monitor for/manage infection if present   Moisturize dry skin  Goal: Prevent/minimize sheer/friction injuries  Outcome: Progressing  Flowsheets (Taken 3/13/2025 0742)  Prevent/minimize sheer/friction injuries:   Use pull sheet   Increase activity/out of bed for meals   HOB 30 degrees or less  Goal: Promote/optimize nutrition  Outcome: Progressing  Flowsheets (Taken 3/13/2025 0742)  Promote/optimize nutrition:   Monitor/record intake including meals   Consume > 50% meals/supplements   Offer water/supplements/favorite foods  Goal: Promote skin healing  Outcome: Progressing  Flowsheets (Taken 3/13/2025 0742)  Promote skin  healing:   Assess skin/pad under line(s)/device(s)   Protective dressings over bony prominences   Turn/reposition every 2 hours/use positioning/transfer devices     Problem: Fall/Injury  Goal: Not fall by end of shift  Outcome: Progressing  Goal: Be free from injury by end of the shift  Outcome: Progressing  Goal: Verbalize understanding of personal risk factors for fall in the hospital  Outcome: Progressing  Goal: Verbalize understanding of risk factor reduction measures to prevent injury from fall in the home  Outcome: Progressing  Goal: Use assistive devices by end of the shift  Outcome: Progressing  Goal: Pace activities to prevent fatigue by end of the shift  Outcome: Progressing     Problem: Pain  Goal: Takes deep breaths with improved pain control throughout the shift  Outcome: Progressing  Goal: Turns in bed with improved pain control throughout the shift  Outcome: Progressing  Goal: Walks with improved pain control throughout the shift  Outcome: Progressing  Goal: Performs ADL's with improved pain control throughout shift  Outcome: Progressing  Goal: Participates in PT with improved pain control throughout the shift  Outcome: Progressing  Goal: Free from opioid side effects throughout the shift  Outcome: Progressing  Goal: Free from acute confusion related to pain meds throughout the shift  Outcome: Progressing     Problem: Respiratory  Goal: Clear secretions with interventions this shift  Outcome: Progressing  Goal: Minimize anxiety/maximize coping throughout shift  Outcome: Progressing  Goal: Minimal/no exertional discomfort or dyspnea this shift  Outcome: Progressing  Goal: No signs of respiratory distress (eg. Use of accessory muscles. Peds grunting)  Outcome: Progressing  Goal: Patent airway maintained this shift  Outcome: Progressing

## 2025-03-13 NOTE — NURSING NOTE
Pt being discharged home with wife. Meds to bed delivered, pt educated on discharge instructions and medications. IV removed per policy. No further questions at this time.
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
## 2025-03-14 ENCOUNTER — PATIENT OUTREACH (OUTPATIENT)
Dept: PRIMARY CARE | Facility: CLINIC | Age: 65
End: 2025-03-14
Payer: COMMERCIAL

## 2025-03-14 NOTE — PROGRESS NOTES
Discharge Facility: [facility]   Discharge diagnoses:   SIRS 2/2 AECOPD  Admission Date: 3/11/25  Discharge Date: 3/13/25    PCP Appointment Date: 3/18/25  Specialist Appointment Date: Schedule an appointment with ZENAIDA Arceo ([facility]) in 2 weeks (3/27/2025); pulmonology hospital followup   Hospital Encounter and Summary Linked: ED to Hosp-Admission (Discharged) with Gallo Lowe MD; Kolby Block DO (03/11/2025)   Discharge Summary by Nadege Kirby APRN-CNP (03/13/2025 07:38)   See discharge assessment below for further   details    Wrap Up  Wrap Up Additional Comments: Pt. denies SOB but does still state he is having a difficult time breathing. He using duo-neb as orderd, with effect. He states discharge instructions were clear from hosp discharge. He voiced understanding of the importance of following up with PCP and speciality teams to remain healthy at home. He denies further questions/concerns. (3/14/2025  8:52 AM)    Engagement  Call Start Time: 0852 (Call completed with Leonid) (3/14/2025  8:52 AM)    Medications  Medications reviewed with patient/caregiver?: Yes (3/14/2025  8:52 AM)  Is the patient having any side effects they believe may be caused by any medication additions or changes?: No (3/14/2025  8:52 AM)  Does the patient have all medications ordered at discharge?: Yes (3/14/2025  8:52 AM)  Care Management Interventions: No intervention needed (3/14/2025  8:52 AM)  Prescription Comments: START taking: doxycycline (Vibramycin) Mucus Relief ER (guaiFENesin) predniSONE (Deltasone) Start taking on: March 13, 2025 (3/14/2025  8:52 AM)  Is the patient taking all medications as directed (includes completed medication regime)?: Yes (3/14/2025  8:52 AM)  Care Management Interventions: Provided patient education (3/14/2025  8:52 AM)  Medication Comments: Pt. verbalized all medication changes (3/14/2025  8:52 AM)    Appointments  Does the patient have a primary care provider?: Yes  (3/14/2025  8:52 AM)  Care Management Interventions: Verified appointment date/time/provider (3/14/2025  8:52 AM)  Has the patient kept scheduled appointments due by today?: Yes (3/14/2025  8:52 AM)  Care Management Interventions: Advised patient to keep appointment (3/14/2025  8:52 AM)    Self Management  What is the home health agency?: n/a (3/14/2025  8:52 AM)  Has home health visited the patient within 72 hours of discharge?: Not applicable (3/14/2025  8:52 AM)  What Durable Medical Equipment (DME) was ordered?: n/a- Has nebulizer at home (3/14/2025  8:52 AM)    Patient Teaching  Does the patient have access to their discharge instructions?: Yes (3/14/2025  8:52 AM)  Care Management Interventions: Reviewed instructions with patient (3/14/2025  8:52 AM)  What is the patient's perception of their health status since discharge?: Same (3/14/2025  8:52 AM)  Is the patient/caregiver able to teach back the hierarchy of who to call/visit for symptoms/problems? PCP, Specialist, Home Health nurse, Urgent Care, ED, 911: Yes (3/14/2025  8:52 AM)  Patient/Caregiver Education Comments: Pt states he is still having a hard time breathing, is using breathing tx as ordered. (3/14/2025  8:52 AM)

## 2025-03-15 ENCOUNTER — APPOINTMENT (OUTPATIENT)
Dept: RADIOLOGY | Facility: HOSPITAL | Age: 65
End: 2025-03-15
Payer: COMMERCIAL

## 2025-03-15 ENCOUNTER — APPOINTMENT (OUTPATIENT)
Dept: CARDIOLOGY | Facility: HOSPITAL | Age: 65
End: 2025-03-15
Payer: COMMERCIAL

## 2025-03-15 ENCOUNTER — HOSPITAL ENCOUNTER (EMERGENCY)
Facility: HOSPITAL | Age: 65
Discharge: HOME | End: 2025-03-16
Attending: EMERGENCY MEDICINE
Payer: COMMERCIAL

## 2025-03-15 VITALS
BODY MASS INDEX: 26.52 KG/M2 | RESPIRATION RATE: 18 BRPM | OXYGEN SATURATION: 97 % | WEIGHT: 165 LBS | SYSTOLIC BLOOD PRESSURE: 132 MMHG | DIASTOLIC BLOOD PRESSURE: 76 MMHG | HEART RATE: 72 BPM | HEIGHT: 66 IN

## 2025-03-15 DIAGNOSIS — K59.00 CONSTIPATION, UNSPECIFIED CONSTIPATION TYPE: ICD-10-CM

## 2025-03-15 DIAGNOSIS — R07.9 CHEST PAIN, UNSPECIFIED TYPE: Primary | ICD-10-CM

## 2025-03-15 LAB
ALBUMIN SERPL BCP-MCNC: 4 G/DL (ref 3.4–5)
ALP SERPL-CCNC: 58 U/L (ref 33–136)
ALT SERPL W P-5'-P-CCNC: 43 U/L (ref 10–52)
ANION GAP SERPL CALC-SCNC: 12 MMOL/L (ref 10–20)
AST SERPL W P-5'-P-CCNC: 67 U/L (ref 9–39)
BASOPHILS # BLD AUTO: 0.05 X10*3/UL (ref 0–0.1)
BASOPHILS NFR BLD AUTO: 0.4 %
BILIRUB SERPL-MCNC: 0.7 MG/DL (ref 0–1.2)
BNP SERPL-MCNC: 46 PG/ML (ref 0–99)
BUN SERPL-MCNC: 30 MG/DL (ref 6–23)
CALCIUM SERPL-MCNC: 9 MG/DL (ref 8.6–10.3)
CARDIAC TROPONIN I PNL SERPL HS: 3 NG/L (ref 0–20)
CARDIAC TROPONIN I PNL SERPL HS: 4 NG/L (ref 0–20)
CHLORIDE SERPL-SCNC: 107 MMOL/L (ref 98–107)
CO2 SERPL-SCNC: 22 MMOL/L (ref 21–32)
CREAT SERPL-MCNC: 1.04 MG/DL (ref 0.5–1.3)
EGFRCR SERPLBLD CKD-EPI 2021: 80 ML/MIN/1.73M*2
EOSINOPHIL # BLD AUTO: 0.01 X10*3/UL (ref 0–0.7)
EOSINOPHIL NFR BLD AUTO: 0.1 %
ERYTHROCYTE [DISTWIDTH] IN BLOOD BY AUTOMATED COUNT: 12.6 % (ref 11.5–14.5)
GLUCOSE SERPL-MCNC: 124 MG/DL (ref 74–99)
HCT VFR BLD AUTO: 41.5 % (ref 41–52)
HGB BLD-MCNC: 14.4 G/DL (ref 13.5–17.5)
IMM GRANULOCYTES # BLD AUTO: 0.5 X10*3/UL (ref 0–0.7)
IMM GRANULOCYTES NFR BLD AUTO: 4 % (ref 0–0.9)
LYMPHOCYTES # BLD AUTO: 1.74 X10*3/UL (ref 1.2–4.8)
LYMPHOCYTES NFR BLD AUTO: 13.9 %
MAGNESIUM SERPL-MCNC: 2.21 MG/DL (ref 1.6–2.4)
MCH RBC QN AUTO: 32.7 PG (ref 26–34)
MCHC RBC AUTO-ENTMCNC: 34.7 G/DL (ref 32–36)
MCV RBC AUTO: 94 FL (ref 80–100)
MONOCYTES # BLD AUTO: 0.86 X10*3/UL (ref 0.1–1)
MONOCYTES NFR BLD AUTO: 6.9 %
NEUTROPHILS # BLD AUTO: 9.38 X10*3/UL (ref 1.2–7.7)
NEUTROPHILS NFR BLD AUTO: 74.7 %
NRBC BLD-RTO: 0 /100 WBCS (ref 0–0)
PLATELET # BLD AUTO: 208 X10*3/UL (ref 150–450)
POTASSIUM SERPL-SCNC: 5.2 MMOL/L (ref 3.5–5.3)
PROT SERPL-MCNC: 6.8 G/DL (ref 6.4–8.2)
RBC # BLD AUTO: 4.41 X10*6/UL (ref 4.5–5.9)
SODIUM SERPL-SCNC: 136 MMOL/L (ref 136–145)
WBC # BLD AUTO: 12.5 X10*3/UL (ref 4.4–11.3)

## 2025-03-15 PROCEDURE — 71045 X-RAY EXAM CHEST 1 VIEW: CPT

## 2025-03-15 PROCEDURE — 83880 ASSAY OF NATRIURETIC PEPTIDE: CPT | Performed by: EMERGENCY MEDICINE

## 2025-03-15 PROCEDURE — 93005 ELECTROCARDIOGRAM TRACING: CPT

## 2025-03-15 PROCEDURE — 99285 EMERGENCY DEPT VISIT HI MDM: CPT | Mod: 25 | Performed by: EMERGENCY MEDICINE

## 2025-03-15 PROCEDURE — 80053 COMPREHEN METABOLIC PANEL: CPT | Performed by: EMERGENCY MEDICINE

## 2025-03-15 PROCEDURE — 74018 RADEX ABDOMEN 1 VIEW: CPT

## 2025-03-15 PROCEDURE — 71045 X-RAY EXAM CHEST 1 VIEW: CPT | Performed by: RADIOLOGY

## 2025-03-15 PROCEDURE — 84484 ASSAY OF TROPONIN QUANT: CPT | Performed by: EMERGENCY MEDICINE

## 2025-03-15 PROCEDURE — 36415 COLL VENOUS BLD VENIPUNCTURE: CPT | Performed by: EMERGENCY MEDICINE

## 2025-03-15 PROCEDURE — 74018 RADEX ABDOMEN 1 VIEW: CPT | Performed by: RADIOLOGY

## 2025-03-15 PROCEDURE — 96374 THER/PROPH/DIAG INJ IV PUSH: CPT

## 2025-03-15 PROCEDURE — 85025 COMPLETE CBC W/AUTO DIFF WBC: CPT | Performed by: EMERGENCY MEDICINE

## 2025-03-15 PROCEDURE — 83735 ASSAY OF MAGNESIUM: CPT | Performed by: EMERGENCY MEDICINE

## 2025-03-15 PROCEDURE — 2500000004 HC RX 250 GENERAL PHARMACY W/ HCPCS (ALT 636 FOR OP/ED): Performed by: EMERGENCY MEDICINE

## 2025-03-15 RX ORDER — ONDANSETRON HYDROCHLORIDE 2 MG/ML
4 INJECTION, SOLUTION INTRAVENOUS ONCE
Status: COMPLETED | OUTPATIENT
Start: 2025-03-15 | End: 2025-03-15

## 2025-03-15 RX ADMIN — ONDANSETRON 4 MG: 2 INJECTION INTRAMUSCULAR; INTRAVENOUS at 23:47

## 2025-03-15 ASSESSMENT — PAIN DESCRIPTION - LOCATION: LOCATION: CHEST

## 2025-03-15 ASSESSMENT — LIFESTYLE VARIABLES
EVER FELT BAD OR GUILTY ABOUT YOUR DRINKING: NO
EVER HAD A DRINK FIRST THING IN THE MORNING TO STEADY YOUR NERVES TO GET RID OF A HANGOVER: NO
HAVE PEOPLE ANNOYED YOU BY CRITICIZING YOUR DRINKING: NO
TOTAL SCORE: 0
HAVE YOU EVER FELT YOU SHOULD CUT DOWN ON YOUR DRINKING: NO

## 2025-03-15 ASSESSMENT — PAIN SCALES - GENERAL
PAINLEVEL_OUTOF10: 1
PAINLEVEL_OUTOF10: 1

## 2025-03-15 ASSESSMENT — PAIN DESCRIPTION - PAIN TYPE: TYPE: ACUTE PAIN

## 2025-03-15 ASSESSMENT — PAIN - FUNCTIONAL ASSESSMENT: PAIN_FUNCTIONAL_ASSESSMENT: 0-10
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
## 2025-03-16 NOTE — DISCHARGE INSTRUCTIONS
I recommend using miralax as a first line treatment for constipation. Over the next few days, this can be given twice per day and then decreased as needed to keep the stool soft.     2nd line, you can add dulcolax to nightly if she has not had a bowel movement during the day.     3rd line, you can add senna or magnesium citrate to aid in treating the constipation. This medication is more likely to cause cramping. You can take tylenol and ibuprofen as needed to help with the cramping while getting the constipation under control.

## 2025-03-17 PROBLEM — J44.1 COPD WITH ACUTE EXACERBATION (MULTI): Status: RESOLVED | Noted: 2025-01-16 | Resolved: 2025-03-17

## 2025-03-17 PROBLEM — R05.9 COUGH, UNSPECIFIED: Status: RESOLVED | Noted: 2021-12-19 | Resolved: 2025-03-17

## 2025-03-18 ENCOUNTER — APPOINTMENT (OUTPATIENT)
Dept: PRIMARY CARE | Facility: CLINIC | Age: 65
End: 2025-03-18
Payer: COMMERCIAL

## 2025-03-18 VITALS
HEIGHT: 66 IN | RESPIRATION RATE: 16 BRPM | WEIGHT: 164 LBS | DIASTOLIC BLOOD PRESSURE: 70 MMHG | SYSTOLIC BLOOD PRESSURE: 118 MMHG | OXYGEN SATURATION: 95 % | BODY MASS INDEX: 26.36 KG/M2 | HEART RATE: 84 BPM

## 2025-03-18 DIAGNOSIS — Z09 HOSPITAL DISCHARGE FOLLOW-UP: ICD-10-CM

## 2025-03-18 DIAGNOSIS — J44.1 COPD WITH ACUTE EXACERBATION (MULTI): ICD-10-CM

## 2025-03-18 DIAGNOSIS — B35.3 TINEA PEDIS OF BOTH FEET: ICD-10-CM

## 2025-03-18 DIAGNOSIS — K59.09 CHRONIC CONSTIPATION: Primary | ICD-10-CM

## 2025-03-18 DIAGNOSIS — R07.89 CHEST TIGHTNESS: ICD-10-CM

## 2025-03-18 PROCEDURE — 1111F DSCHRG MED/CURRENT MED MERGE: CPT | Performed by: FAMILY MEDICINE

## 2025-03-18 PROCEDURE — 1159F MED LIST DOCD IN RCRD: CPT | Performed by: FAMILY MEDICINE

## 2025-03-18 PROCEDURE — 4004F PT TOBACCO SCREEN RCVD TLK: CPT | Performed by: FAMILY MEDICINE

## 2025-03-18 PROCEDURE — 99496 TRANSJ CARE MGMT HIGH F2F 7D: CPT | Performed by: FAMILY MEDICINE

## 2025-03-18 PROCEDURE — 3008F BODY MASS INDEX DOCD: CPT | Performed by: FAMILY MEDICINE

## 2025-03-18 RX ORDER — SENNOSIDES 8.6 MG/1
1 TABLET ORAL DAILY
Qty: 30 TABLET | Refills: 11 | Status: SHIPPED | OUTPATIENT
Start: 2025-03-18 | End: 2026-03-18

## 2025-03-18 RX ORDER — POLYETHYLENE GLYCOL 3350 17 G/17G
17 POWDER, FOR SOLUTION ORAL DAILY
Qty: 30 PACKET | Refills: 11 | Status: SHIPPED | OUTPATIENT
Start: 2025-03-18 | End: 2026-03-18

## 2025-03-18 RX ORDER — KETOCONAZOLE 20 MG/G
CREAM TOPICAL 2 TIMES DAILY
Qty: 30 G | Refills: 2 | Status: SHIPPED | OUTPATIENT
Start: 2025-03-18 | End: 2025-04-15

## 2025-03-18 RX ORDER — GUAIFENESIN 600 MG/1
600 TABLET, EXTENDED RELEASE ORAL DAILY PRN
Qty: 30 TABLET | Refills: 3 | Status: SHIPPED | OUTPATIENT
Start: 2025-03-18 | End: 2025-07-16

## 2025-03-18 RX ORDER — KETOCONAZOLE 200 MG/1
200 TABLET ORAL DAILY
Qty: 14 TABLET | Refills: 0 | Status: SHIPPED | OUTPATIENT
Start: 2025-03-18 | End: 2025-04-01

## 2025-03-18 ASSESSMENT — ANXIETY QUESTIONNAIRES
5. BEING SO RESTLESS THAT IT IS HARD TO SIT STILL: NOT AT ALL
3. WORRYING TOO MUCH ABOUT DIFFERENT THINGS: NOT AT ALL
IF YOU CHECKED OFF ANY PROBLEMS ON THIS QUESTIONNAIRE, HOW DIFFICULT HAVE THESE PROBLEMS MADE IT FOR YOU TO DO YOUR WORK, TAKE CARE OF THINGS AT HOME, OR GET ALONG WITH OTHER PEOPLE: NOT DIFFICULT AT ALL
6. BECOMING EASILY ANNOYED OR IRRITABLE: NOT AT ALL
4. TROUBLE RELAXING: NOT AT ALL
GAD7 TOTAL SCORE: 0
2. NOT BEING ABLE TO STOP OR CONTROL WORRYING: NOT AT ALL
1. FEELING NERVOUS, ANXIOUS, OR ON EDGE: NOT AT ALL
7. FEELING AFRAID AS IF SOMETHING AWFUL MIGHT HAPPEN: NOT AT ALL

## 2025-03-18 ASSESSMENT — ENCOUNTER SYMPTOMS
DEPRESSION: 0
SHORTNESS OF BREATH: 1
LOSS OF SENSATION IN FEET: 0
OCCASIONAL FEELINGS OF UNSTEADINESS: 0
CHEST TIGHTNESS: 1
CONSTIPATION: 1

## 2025-03-18 NOTE — ASSESSMENT & PLAN NOTE
Orders:    ketoconazole (NIZOral) 2 % cream; Apply topically 2 times a day for 28 days.    ketoconazole (NIZOral) 200 mg tablet; Take 1 tablet (200 mg) by mouth once daily for 14 days.

## 2025-03-18 NOTE — LETTER
March 18, 2025     Patient: Leonid Molina   YOB: 1960   Date of Visit: 3/18/2025       To Whom It May Concern:    Leonid Molina was seen in my clinic on 3/18/2025 at 8:00 am. Please excuse Leonid for his absence from work from 3/17/2025 to 3/23/2025, and he will return to work with no restrictions 3/24/2025.   If you have any questions or concerns, please don't hesitate to call.         Sincerely,         Shannan Haider MD

## 2025-03-18 NOTE — ASSESSMENT & PLAN NOTE
Leonid is doing well and slowly improving.  He has not yet done with antibiotic and steroid so I did recommend that he finish this for the planned return to work of 3/24/2025

## 2025-03-18 NOTE — PATIENT INSTRUCTIONS
I would advise taking it easy this week, and finishing antibiotic and steroid  Continue Anoro and nebulizers as needed,   Return to work, 3/24/2025 with no restrictions  Sent medications ofr athlete's foot.     Great job on quitting smoking.

## 2025-03-18 NOTE — PROGRESS NOTES
"Patient: Leonid Molina \"Juhi\"  : 1960  PCP: Shannan Haider MD  MRN: [id number]  Program: Transitional Care Management  Status: Enrolled  Effective Dates: 3/14/2025 - present  Responsible Staff: Lore Matamoros LPN  Social Drivers to be Addressed: No information to display         Leonid Winchester" is a 65 y.o. male presenting today for follow-up after being discharged from the hospital 5 days ago. The main problem requiring admission was copd exacerbation, then was readmitted 3/15/2025, due to chest discomfort due to constipation, sent home and got magnesium citrate, took bottle and then had results later that day and the next day.  He also has miralax and can take that as needed -The discharge summary and/or Transitional Care Management documentation was reviewed. Medication reconciliation was performed as indicated via the \"Allan as Reviewed\" timestamp.     Leonid Molina \"Juhi\" was contacted by Transitional Care Management services two days after his discharge. This encounter and supporting documentation was reviewed.    Review of Systems   Respiratory:  Positive for chest tightness and shortness of breath.    Gastrointestinal:  Positive for constipation.   Skin:  Positive for rash.   Also states has rash on feet that he is consistent with athlete's foot, has tried over-the-counter medication on both feet with no avail.  Is requesting prescription medication    /70   Pulse 84   Resp 16   Ht 1.676 m (5' 6\")   Wt 74.4 kg (164 lb)   SpO2 95%   BMI 26.47 kg/m²     Physical Exam  Vitals reviewed.   Constitutional:       Appearance: Normal appearance.   HENT:      Head: Normocephalic and atraumatic.      Right Ear: Tympanic membrane normal.      Left Ear: Tympanic membrane normal.      Nose: Nose normal.      Mouth/Throat:      Mouth: Mucous membranes are moist.      Pharynx: Oropharynx is clear.   Eyes:      Extraocular Movements: Extraocular movements intact.      " Conjunctiva/sclera: Conjunctivae normal.      Pupils: Pupils are equal, round, and reactive to light.   Cardiovascular:      Rate and Rhythm: Normal rate and regular rhythm.      Pulses: Normal pulses.      Heart sounds: Normal heart sounds.   Pulmonary:      Effort: Pulmonary effort is normal.      Breath sounds: Wheezing and rhonchi present.      Comments: Right sided breath sounds.   Abdominal:      General: Abdomen is flat. Bowel sounds are normal.      Palpations: Abdomen is soft.   Musculoskeletal:         General: Normal range of motion.      Cervical back: Normal range of motion and neck supple.   Skin:     General: Skin is warm and dry.      Capillary Refill: Capillary refill takes less than 2 seconds.      Findings: Rash present.      Comments: Athlete's foot in between toes on both feet   Neurological:      General: No focal deficit present.      Mental Status: He is alert and oriented to person, place, and time.   Psychiatric:         Mood and Affect: Mood normal.         Behavior: Behavior normal.         The complexity of medical decision making for this patient's transitional care is high.    Assessment/Plan   Assessment & Plan  Chronic constipation    Orders:    polyethylene glycol (Glycolax, Miralax) 17 gram packet; Take 17 g by mouth once daily. Mix 1 cap (17g) into 8 ounces of fluid.    sennosides (Senokot) 8.6 mg tablet; Take 1 tablet (8.6 mg) by mouth once daily.  May use magnesium citrate if needed  COPD with acute exacerbation (Multi)    Orders:    guaiFENesin (Mucinex) 600 mg 12 hr tablet; Take 1 tablet (600 mg) by mouth once daily as needed for cough or congestion. Do not crush, chew, or split.    Follow Up In [facility] - PCP - Established; Future    Chest tightness  Most likely due to shortness of breath, hopefully will resolve over time.        Tinea pedis of both feet    Orders:    ketoconazole (NIZOral) 2 % cream; Apply topically 2 times a day for 28 days.    ketoconazole  (NIZOral) 200 mg tablet; Take 1 tablet (200 mg) by mouth once daily for 14 days.    Hospital discharge follow-up  Leonid is doing well and slowly improving.  He has not yet done with antibiotic and steroid so I did recommend that he finish this for the planned return to work of 3/24/2025         I would advise taking it easy this week, and finishing antibiotic and steroid  Continue Anoro and nebulizers as needed,   Return to work, 3/24/2025 with no restrictons

## 2025-03-19 LAB
ATRIAL RATE: 71 BPM
P AXIS: 60 DEGREES
PR INTERVAL: 158 MS
Q ONSET: 249 MS
QRS COUNT: 11 BEATS
QRS DURATION: 85 MS
QT INTERVAL: 379 MS
QTC CALCULATION(BAZETT): 409 MS
QTC FREDERICIA: 399 MS
R AXIS: 11 DEGREES
T AXIS: 2 DEGREES
T OFFSET: 439 MS
VENTRICULAR RATE: 70 BPM

## 2025-03-20 ENCOUNTER — PATIENT OUTREACH (OUTPATIENT)
Dept: PRIMARY CARE | Facility: CLINIC | Age: 65
End: 2025-03-20
Payer: COMMERCIAL

## 2025-03-20 ENCOUNTER — TELEPHONE (OUTPATIENT)
Dept: PRIMARY CARE | Facility: CLINIC | Age: 65
End: 2025-03-20
Payer: COMMERCIAL

## 2025-03-20 NOTE — TELEPHONE ENCOUNTER
----- Message from Apakau Showers sent at 3/20/2025 10:43 AM EDT -----  Regarding: TCM/ 14 day call  Call regarding appt. Office Visit with Shannan Haider MD (03/18/2025)  after hospitalization.  At time of outreach call the patient feels as if their condition has improved since last visit.  Reviewed the PCP appointment with the pt and addressed any questions or concerns.    Pt. Reports after he eats dinner, he is having stomach pain. He reports he is having normal BM. He reports it hurts. He states he went to bed last night, and then all day the following day it was painful. He had breakfast this morning and his AM meds. He states that it currently is hurting again. He is using senokot, and Miralax. He states he has been doing this the past few days.

## 2025-03-20 NOTE — PROGRESS NOTES
Call regarding appt. Office Visit with Shannan Haider MD (03/18/2025)  after hospitalization.  At time of outreach call the patient feels as if their condition has improved since last visit.  Reviewed the PCP appointment with the pt and addressed any questions or concerns.    Pt. Reports after he eats dinner, he is having stomach pain. He reports he is having normal BM. He reports it hurts. He states he went to bed last night, and then all day the following day it was painful. He had breakfast this morning and his AM meds. He states that it currently is hurting again. He is using senokot, and Miralax. He states he has been doing this the past few days. He reports he did have an episode of emesis last night after having wendys chili. He then took an antiacid, with little relief. Denies nausea, just pain. He denies fever. He is eating and drinking well otherwise.

## 2025-03-21 DIAGNOSIS — J44.1 COPD EXACERBATION (MULTI): Primary | ICD-10-CM

## 2025-03-24 ENCOUNTER — TELEPHONE (OUTPATIENT)
Dept: PRIMARY CARE | Facility: CLINIC | Age: 65
End: 2025-03-24
Payer: COMMERCIAL

## 2025-03-24 DIAGNOSIS — K59.04 CHRONIC IDIOPATHIC CONSTIPATION: ICD-10-CM

## 2025-03-24 DIAGNOSIS — K21.9 GASTROESOPHAGEAL REFLUX DISEASE WITHOUT ESOPHAGITIS: Primary | ICD-10-CM

## 2025-03-24 NOTE — TELEPHONE ENCOUNTER
Per patients wife,    As you know he has had a hospital stay and an er visit the er visit resulted in what they said was constipation. He has done the magnesium citrate. Which he did go. Since getting the prescription from you he has taken that daily but still seems to be miserable. He’s had indigestion which I had over the counter stuff for him which he said has helped. Can he get a script for protonix 40 mg and also Benytl? Other than all of what he’s been doing he’s still very uncomfortable. Thank you     Please advise

## 2025-03-26 RX ORDER — DICYCLOMINE HYDROCHLORIDE 20 MG/1
20 TABLET ORAL 4 TIMES DAILY PRN
Qty: 30 TABLET | Refills: 1 | Status: SHIPPED | OUTPATIENT
Start: 2025-03-26 | End: 2025-05-25

## 2025-03-26 RX ORDER — PANTOPRAZOLE SODIUM 40 MG/1
40 TABLET, DELAYED RELEASE ORAL DAILY
Qty: 30 TABLET | Refills: 1 | Status: SHIPPED | OUTPATIENT
Start: 2025-03-26 | End: 2025-05-25

## 2025-04-09 ENCOUNTER — TELEPHONE (OUTPATIENT)
Dept: PULMONOLOGY | Facility: HOSPITAL | Age: 65
End: 2025-04-09
Payer: COMMERCIAL

## 2025-04-09 DIAGNOSIS — J44.9 CHRONIC OBSTRUCTIVE PULMONARY DISEASE, UNSPECIFIED COPD TYPE (MULTI): Primary | ICD-10-CM

## 2025-04-09 RX ORDER — PREDNISONE 10 MG/1
TABLET ORAL
Qty: 40 TABLET | Refills: 0 | Status: SHIPPED | OUTPATIENT
Start: 2025-04-09 | End: 2025-04-25

## 2025-04-09 RX ORDER — CEFDINIR 300 MG/1
300 CAPSULE ORAL 2 TIMES DAILY
Qty: 14 CAPSULE | Refills: 0 | Status: SHIPPED | OUTPATIENT
Start: 2025-04-09 | End: 2025-04-16

## 2025-04-09 RX ORDER — BUDESONIDE, GLYCOPYRROLATE, AND FORMOTEROL FUMARATE 160; 9; 4.8 UG/1; UG/1; UG/1
2 AEROSOL, METERED RESPIRATORY (INHALATION)
Qty: 10.7 G | Refills: 11 | Status: SHIPPED | OUTPATIENT
Start: 2025-04-09

## 2025-04-09 NOTE — TELEPHONE ENCOUNTER
Patient acknowledged understanding. All questions answered at this time.       ----- Message from Linda Salazar sent at 4/9/2025  1:48 PM EDT -----  Okay he will stop Anoro when he gets this and it is two puffs twice a day, everyday rinse mouth after use. Then I can see him back in a few weeks to reassess symptoms.  ----- Message -----  From: Angelique Mane RN  Sent: 4/9/2025   1:45 PM EDT  To: ZENAIDA Arceo    Patient does not feel as if he has allergy symptoms. He is agreeable to the Breztri.  ----- Message -----  From: ZENAIDA Arceo  Sent: 4/9/2025  11:23 AM EDT  To: Angelique Mane RN    His chest X-rays are all with clear lungs. You can make an appointment with me in a few weeks for sooner visit. In the meantime I can send him Breztri which is 2 puffs twice a day, everyday and he will stop Anoro. This is with an extra medication in it for COPD. Is he having any allergic rhinitis symptoms? This can also be a trigger for him, if so I can send him medications for this.  ----- Message -----  From: Angelique Mane RN  Sent: 4/9/2025  10:22 AM EDT  To: ZENAIDA Arceo    Patient states he is not smoking. He states this just keeps coming back he is not sure what else to do. He wants you to look at his x-rays to see if you can figure out what is going on. Patient is requesting a sooner appointment so we can get to the bottom of this.  ----- Message -----  From: ZENAIDA Arceo  Sent: 4/9/2025   9:56 AM EDT  To: Angelique Mane RN    He has had a few courses of antibiotics and prednisone recently and was hospitalized last month for acute exacerbation. Can you ensure he did not start smoking again, if so remind him that with continued smoking he is going to continue to get exacerbations unfortunately. I sent him one more round of prednisone and Cefdinir BID x 1 week but if this does not help he is going to have to seek care in ER unfortunately.  ----- Message -----  From: Angelique Mane RN  Sent:  4/9/2025   9:33 AM EDT  To: Linda Salazar, APRN-CNP    Patients wife called in patient is having a hard time breathing. States he has been sick for a few days now. Patient is coughing up a lot of phlegm with unknown color. Patient has not complained of fever or chills. Patient using nebulizer 3-4x per day.

## 2025-04-15 ENCOUNTER — PATIENT OUTREACH (OUTPATIENT)
Dept: PRIMARY CARE | Facility: CLINIC | Age: 65
End: 2025-04-15
Payer: COMMERCIAL

## 2025-04-15 NOTE — PROGRESS NOTES
Successful outreach to patient regarding hospitalization as patient continues TCM program.   At time of outreach call the patient feels as if their condition has improved since initial visit with PCP or specialist.  Questions or concerns addressed at this time with patient.   Provided contact information to patient if any further non-emergent needs arise.     Denies further questions/concerns at this time.

## 2025-05-01 ENCOUNTER — TELEPHONE (OUTPATIENT)
Dept: PULMONOLOGY | Facility: HOSPITAL | Age: 65
End: 2025-05-01
Payer: COMMERCIAL

## 2025-05-01 ENCOUNTER — APPOINTMENT (OUTPATIENT)
Dept: PULMONOLOGY | Facility: HOSPITAL | Age: 65
End: 2025-05-01
Payer: COMMERCIAL

## 2025-05-01 ASSESSMENT — ENCOUNTER SYMPTOMS
FATIGUE: 0
FEVER: 0
UNEXPECTED WEIGHT CHANGE: 0
WHEEZING: 1
SHORTNESS OF BREATH: 1
CHILLS: 0
RHINORRHEA: 0
COUGH: 1

## 2025-05-01 NOTE — PROGRESS NOTES
"Subjective   Patient ID: Leonid Molina \"Kimberly" is a 65 y.o. male who presents for NPV for COPD.     HPI: Patient has PMH of COPD. He is here to establish with new pulmonary group. He is currently on Anoro and uses albuterol or nebulizer prn. He was recently treated for an acute exacerbation by his PCP on 1/16. He completed prednisone and antibiotics today. He states that he typically gets about one exacerbation per year. He is feeling significant improvement since being treated but still is wheezing. He had PFTs done here in December 2024. He denies sinus drainage and congestion. He states he was following annually with prior pulmonology group and does get yearly LDCT. He has never been on oxygen. He has not smoked in 2 weeks but prior has been smoking at 1 ppd x 45 years. He worked in a factory for 35 years.     Review of Systems   Constitutional:  Negative for chills, fatigue, fever and unexpected weight change.   HENT:  Negative for congestion, postnasal drip and rhinorrhea.    Respiratory:  Positive for cough (denies hemoptysis.), shortness of breath and wheezing.    Cardiovascular:  Negative for chest pain and leg swelling.   All other systems reviewed and are negative.      Objective   Physical Exam  Vitals reviewed.   Constitutional:       Appearance: Normal appearance.   HENT:      Head: Normocephalic.   Cardiovascular:      Rate and Rhythm: Normal rate and regular rhythm.   Pulmonary:      Effort: Pulmonary effort is normal.      Breath sounds: Wheezing present.   Skin:     General: Skin is warm and dry.   Neurological:      Mental Status: He is alert.         Assessment/Plan   COPD, with recent exacerbation  Nicotine dependence, in remission     Plan:    -PFTs done in December 2024 with moderate COPD. FEV1/FVC 45, FEV1 66, , DLCO 88%. Discussed results with patient at length.   -He was just treated with short course of prednisone and antibiotics by PCP on 1/16/25. He states that he has felt " significant improvement since taking, and completed course today. He is still wheezing significantly on exam today I suspect that he is going to need a prolonged prednisone taper. I sent this for him. He will notify me if any worsening of his symptoms and I will get a CXR.   -Continue on Anoro daily.  -Continue albuterol prn.  -Continue Duoneb prn.  -He was able to quit smoking 2 weeks ago when he was sick but prior he was smoking at 1 ppd x 45 years. Encouraged him to continue to abstain from smoking, he is motivated.   -LDCT was done 12/9/24 and showed a few small scattered nodules up to 5 mm, we will continue yearly screening for him December 2025, order placed today.     Overall we discussed testing results at length. I will extend his prednisone taper but will continue current regimen otherwise. He was following with prior pulmonologist annually he prefers to continue with this. I ordered next LDCT for December. I will see him back in one year but he is agreeable to call me if no improvement after extended prednisone or any worsening.     Total time:  40 min

## 2025-05-01 NOTE — TELEPHONE ENCOUNTER
Patient had appointment with us today 5/1/25 but [facility] crashed and we were unable to do his appointment and had to reschedule him for 5/2/25 @ 1:00. Patient expressed that he felt his breathing was worse and his sinuses/allergies were bothering him. We could not send him anything at this time due to the system being down and we were unable to get into the patients chart. Recommended he be evaluated in the ER or urgent care if any worsening before he was able to see us tomorrow. Patient was agreeable to treatment plan and acknowledged understanding.

## 2025-05-02 ENCOUNTER — OFFICE VISIT (OUTPATIENT)
Dept: PULMONOLOGY | Facility: HOSPITAL | Age: 65
End: 2025-05-02
Payer: COMMERCIAL

## 2025-05-02 VITALS
TEMPERATURE: 96.6 F | SYSTOLIC BLOOD PRESSURE: 125 MMHG | HEART RATE: 71 BPM | DIASTOLIC BLOOD PRESSURE: 75 MMHG | WEIGHT: 174 LBS | HEIGHT: 66 IN | OXYGEN SATURATION: 96 % | RESPIRATION RATE: 16 BRPM | BODY MASS INDEX: 27.97 KG/M2

## 2025-05-02 DIAGNOSIS — J30.9 ALLERGIC RHINITIS, UNSPECIFIED SEASONALITY, UNSPECIFIED TRIGGER: ICD-10-CM

## 2025-05-02 DIAGNOSIS — J44.9 CHRONIC OBSTRUCTIVE PULMONARY DISEASE, UNSPECIFIED COPD TYPE (MULTI): Primary | ICD-10-CM

## 2025-05-02 DIAGNOSIS — J45.909 ASTHMA, UNSPECIFIED ASTHMA SEVERITY, UNSPECIFIED WHETHER COMPLICATED, UNSPECIFIED WHETHER PERSISTENT (HHS-HCC): ICD-10-CM

## 2025-05-02 DIAGNOSIS — Z87.891 FORMER SMOKER: ICD-10-CM

## 2025-05-02 PROCEDURE — 99214 OFFICE O/P EST MOD 30 MIN: CPT | Performed by: NURSE PRACTITIONER

## 2025-05-02 PROCEDURE — 4004F PT TOBACCO SCREEN RCVD TLK: CPT | Performed by: NURSE PRACTITIONER

## 2025-05-02 PROCEDURE — 1159F MED LIST DOCD IN RCRD: CPT | Performed by: NURSE PRACTITIONER

## 2025-05-02 PROCEDURE — 3008F BODY MASS INDEX DOCD: CPT | Performed by: NURSE PRACTITIONER

## 2025-05-02 RX ORDER — LORATADINE 10 MG/1
10 TABLET ORAL DAILY
Qty: 30 TABLET | Refills: 11 | Status: SHIPPED | OUTPATIENT
Start: 2025-05-02

## 2025-05-02 RX ORDER — AZELASTINE 1 MG/ML
1 SPRAY, METERED NASAL 2 TIMES DAILY
Qty: 1 ML | Refills: 11 | Status: SHIPPED | OUTPATIENT
Start: 2025-05-02

## 2025-05-02 RX ORDER — FLUTICASONE FUROATE, UMECLIDINIUM BROMIDE AND VILANTEROL TRIFENATATE 200; 62.5; 25 UG/1; UG/1; UG/1
1 POWDER RESPIRATORY (INHALATION) DAILY
Qty: 1 EACH | Refills: 11 | Status: SHIPPED | OUTPATIENT
Start: 2025-05-02

## 2025-05-02 RX ORDER — IPRATROPIUM BROMIDE AND ALBUTEROL SULFATE 2.5; .5 MG/3ML; MG/3ML
3 SOLUTION RESPIRATORY (INHALATION) 4 TIMES DAILY PRN
Qty: 360 ML | Refills: 11 | Status: SHIPPED | OUTPATIENT
Start: 2025-05-02

## 2025-05-02 RX ORDER — PREDNISONE 20 MG/1
20 TABLET ORAL DAILY
Qty: 7 TABLET | Refills: 0 | Status: SHIPPED | OUTPATIENT
Start: 2025-05-02

## 2025-05-02 ASSESSMENT — ENCOUNTER SYMPTOMS
SHORTNESS OF BREATH: 1
WHEEZING: 1
COUGH: 1
FATIGUE: 0
RHINORRHEA: 0
CHILLS: 0
UNEXPECTED WEIGHT CHANGE: 0
FEVER: 0

## 2025-05-02 NOTE — PROGRESS NOTES
"Subjective   Patient ID: Leonid Molina \"Kimberly" is a 65 y.o. male who presents for follow up for COPD.     HPI: Patient has PMH of COPD. He is here to establish with new pulmonary group. He is currently on Anoro and uses albuterol or nebulizer prn. He was recently treated for an acute exacerbation by his PCP on 1/16. He completed prednisone and antibiotics today. He states that he typically gets about one exacerbation per year. He is feeling significant improvement since being treated but still is wheezing. He had PFTs done here in December 2024. He denies sinus drainage and congestion. He states he was following annually with prior pulmonology group and does get yearly LDCT. He has never been on oxygen. He has not smoked in 2 weeks but prior has been smoking at 1 ppd x 45 years. He worked in a factory for 35 years.     Today he is here for acute visit. He had ended up hospitalized in March again for COPD exacerbation then he called in worse early April. I changed him to   Breztri and gave him another course of prednisone. He states that he had improvement with the prednisone but then has been poor again the past week or so. He states he has noticed sinus congestion and drainage with the pollen. He is unsure if Breztri has helped or not. He is still with some wheezing as well. He is using nebulizer a few times per day currently which helps. He has no other concerns.     Review of Systems   Constitutional:  Negative for chills, fatigue, fever and unexpected weight change.   HENT:  Negative for congestion, postnasal drip and rhinorrhea.    Respiratory:  Positive for cough (denies hemoptysis.), shortness of breath and wheezing.    Cardiovascular:  Negative for chest pain and leg swelling.   All other systems reviewed and are negative.      Objective   Physical Exam  Vitals reviewed.   Constitutional:       Appearance: Normal appearance.   HENT:      Head: Normocephalic.   Cardiovascular:      Rate and Rhythm: " Normal rate and regular rhythm.   Pulmonary:      Effort: Pulmonary effort is normal.      Breath sounds: Wheezing present.   Skin:     General: Skin is warm and dry.   Neurological:      Mental Status: He is alert.         Assessment/Plan   COPD  Suspect bronchial asthma component  Allergic rhinitis   Nicotine dependence, in remission     Plan:    -PFTs done in December 2024 with moderate COPD. FEV1/FVC 45, FEV1 66, , DLCO 88%. Discussed results with patient at length.    -Prednisone 20 mg daily x 1 week.   -Change Breztri to Trelegy 200 one puff once a day.   -Continue albuterol prn.  -Continue Duoneb prn.  -He was able to quit smoking in January 2025 prior he was smoking at 1 ppd x 45 years.   -LDCT was done 12/9/24 and showed a few small scattered nodules up to 5 mm, we will continue yearly screening for him December 2025, order placed.  -Check IGE/RAST.   -Start Loratadine one tablet daily.  -Start Azelastine BID.     Overall he his breathing is still not under control and he has had recurrent exacerbations. I am suspecting that he has an asthma component and allergies triggering symptoms. I will optimize management and obtain testing. I will bring him back for close follow up in 2 months. If no improvement in symptoms with asthma/allergy management will also consider further cardiac workup as well. Will also consider Ohtuvayre or Dupixent on follow up as well. I instructed patient to call sooner if needed.

## 2025-06-16 ENCOUNTER — PATIENT OUTREACH (OUTPATIENT)
Dept: PRIMARY CARE | Facility: CLINIC | Age: 65
End: 2025-06-16
Payer: COMMERCIAL

## 2025-06-18 ENCOUNTER — APPOINTMENT (OUTPATIENT)
Dept: PRIMARY CARE | Facility: CLINIC | Age: 65
End: 2025-06-18
Payer: COMMERCIAL

## 2025-07-10 ENCOUNTER — APPOINTMENT (OUTPATIENT)
Dept: PULMONOLOGY | Facility: HOSPITAL | Age: 65
End: 2025-07-10
Payer: COMMERCIAL

## 2025-07-15 ENCOUNTER — APPOINTMENT (OUTPATIENT)
Dept: PULMONOLOGY | Facility: HOSPITAL | Age: 65
End: 2025-07-15
Payer: COMMERCIAL

## 2025-07-15 VITALS
RESPIRATION RATE: 16 BRPM | DIASTOLIC BLOOD PRESSURE: 77 MMHG | SYSTOLIC BLOOD PRESSURE: 127 MMHG | WEIGHT: 182.3 LBS | HEIGHT: 67 IN | OXYGEN SATURATION: 95 % | HEART RATE: 64 BPM | TEMPERATURE: 97.5 F | BODY MASS INDEX: 28.61 KG/M2

## 2025-07-15 DIAGNOSIS — J45.909 ASTHMA, UNSPECIFIED ASTHMA SEVERITY, UNSPECIFIED WHETHER COMPLICATED, UNSPECIFIED WHETHER PERSISTENT (HHS-HCC): ICD-10-CM

## 2025-07-15 DIAGNOSIS — J44.9 CHRONIC OBSTRUCTIVE PULMONARY DISEASE, UNSPECIFIED COPD TYPE (MULTI): Primary | ICD-10-CM

## 2025-07-15 DIAGNOSIS — Z87.891 FORMER SMOKER: ICD-10-CM

## 2025-07-15 DIAGNOSIS — J30.9 ALLERGIC RHINITIS, UNSPECIFIED SEASONALITY, UNSPECIFIED TRIGGER: ICD-10-CM

## 2025-07-15 PROCEDURE — 1159F MED LIST DOCD IN RCRD: CPT | Performed by: NURSE PRACTITIONER

## 2025-07-15 PROCEDURE — 3008F BODY MASS INDEX DOCD: CPT | Performed by: NURSE PRACTITIONER

## 2025-07-15 PROCEDURE — 99213 OFFICE O/P EST LOW 20 MIN: CPT | Performed by: NURSE PRACTITIONER

## 2025-07-15 PROCEDURE — 99212 OFFICE O/P EST SF 10 MIN: CPT

## 2025-07-15 ASSESSMENT — ENCOUNTER SYMPTOMS
FEVER: 0
OCCASIONAL FEELINGS OF UNSTEADINESS: 0
UNEXPECTED WEIGHT CHANGE: 0
SHORTNESS OF BREATH: 1
DEPRESSION: 0
WHEEZING: 1
COUGH: 1
LOSS OF SENSATION IN FEET: 0
RHINORRHEA: 0
FATIGUE: 0
CHILLS: 0

## 2025-07-15 NOTE — PROGRESS NOTES
"Subjective   Patient ID: Leonid Molina \"Kimberly" is a 65 y.o. male who presents for follow up for COPD.     HPI: Patient has PMH of COPD. He is here to establish with new pulmonary group. He is currently on Anoro and uses albuterol or nebulizer prn. He was recently treated for an acute exacerbation by his PCP on 1/16. He completed prednisone and antibiotics today. He states that he typically gets about one exacerbation per year. He is feeling significant improvement since being treated but still is wheezing. He had PFTs done here in December 2024. He denies sinus drainage and congestion. He states he was following annually with prior pulmonology group and does get yearly LDCT. He has never been on oxygen. He has not smoked in 2 weeks but prior has been smoking at 1 ppd x 45 years. He worked in a factory for 35 years.     Today he is here for follow up. He states now that his allergies are better his breathing has been improved. He is doing well on Trelegy and not having to use nebulizer currently. He states he does get short of breath doing yard work in the heat. He has no other concerns.     Review of Systems   Constitutional:  Negative for chills, fatigue, fever and unexpected weight change.   HENT:  Negative for congestion, postnasal drip and rhinorrhea.    Respiratory:  Positive for cough (denies hemoptysis.), shortness of breath and wheezing.    Cardiovascular:  Negative for chest pain and leg swelling.   All other systems reviewed and are negative.      Objective   Physical Exam  Vitals reviewed.   Constitutional:       Appearance: Normal appearance.   HENT:      Head: Normocephalic.   Cardiovascular:      Rate and Rhythm: Normal rate and regular rhythm.   Pulmonary:      Effort: Pulmonary effort is normal.      Breath sounds: Decreased breath sounds present. No wheezing.   Skin:     General: Skin is warm and dry.   Neurological:      Mental Status: He is alert.         Assessment/Plan   COPD  Suspect " bronchial asthma component  Allergic rhinitis   Nicotine dependence, in remission     Plan:    -PFTs done in December 2024 with moderate COPD. FEV1/FVC 45, FEV1 66, , DLCO 88%. Discussed results with patient at length.    -Continue Trelegy 200 one puff once a day.   -Continue albuterol prn.  -Continue Duoneb prn.  -He was able to quit smoking in January 2025 prior he was smoking at 1 ppd x 45 years.   -LDCT was done 12/9/24 and showed a few small scattered nodules up to 5 mm, we will continue yearly screening for him December 2025, order placed.  -Check IGE/RAST. This was not done yet.  -Continue Loratadine one tablet prn.  -Continue Azelastine BID prn.    Overall his breathing is now under control with Trelegy 200, will continue on this. He will get LDCT in December and I will see him back in March. I instructed patient to call sooner if needed.

## 2025-07-15 NOTE — PATIENT INSTRUCTIONS
Continue Trelegy one puff once a day.  Continue albuterol and nebulizer as needed.   Continue Loratadine (Claritin) and Azelastine as needed.  Please get CT scan of your chest done for lung cancer screening in December.    Call with any with questions or concerns.  Follow up with me in March.

## 2025-08-05 ENCOUNTER — TELEPHONE (OUTPATIENT)
Dept: PULMONOLOGY | Facility: HOSPITAL | Age: 65
End: 2025-08-05
Payer: COMMERCIAL

## 2025-08-05 DIAGNOSIS — J45.909 ASTHMA, UNSPECIFIED ASTHMA SEVERITY, UNSPECIFIED WHETHER COMPLICATED, UNSPECIFIED WHETHER PERSISTENT (HHS-HCC): ICD-10-CM

## 2025-08-05 DIAGNOSIS — J44.9 CHRONIC OBSTRUCTIVE PULMONARY DISEASE, UNSPECIFIED COPD TYPE (MULTI): Primary | ICD-10-CM

## 2025-08-05 RX ORDER — PREDNISONE 10 MG/1
TABLET ORAL
Qty: 40 TABLET | Refills: 0 | Status: SHIPPED | OUTPATIENT
Start: 2025-08-05 | End: 2025-08-21

## 2025-08-05 NOTE — TELEPHONE ENCOUNTER
Patient acknowledged understanding. All questions answered at this time.     ----- Message from Linda Salazar sent at 8/5/2025  2:06 PM EDT -----  Okay I sent him over a prednisone taper. Have him notify us with any worsening. I do not feel he needs antibiotics at this time as it has only been a few days and coughing up clear phlegm and no fever/chills.  ----- Message -----  From: Angelique Mane RN  Sent: 8/5/2025   2:05 PM EDT  To: STEVE Arceo-CNP    Patients wife called instating he has been in flare since this weekend. Patient has no fever or chills. Patient coughing up a lot of clear phlegm. Patient short of breath. Patient does not have anything to monitor oxygen. Patient using his nebulizer and rescue combined between 3-4x per day. Patient requesting a steroid. Patients wife asking if he may need antibiotics as well.

## 2025-09-04 ENCOUNTER — HOSPITAL ENCOUNTER (OUTPATIENT)
Dept: RADIOLOGY | Facility: HOSPITAL | Age: 65
Discharge: HOME | End: 2025-09-04
Payer: COMMERCIAL

## 2025-09-04 ENCOUNTER — RESULTS FOLLOW-UP (OUTPATIENT)
Dept: PULMONOLOGY | Facility: HOSPITAL | Age: 65
End: 2025-09-04
Payer: COMMERCIAL

## 2025-09-04 ENCOUNTER — TELEPHONE (OUTPATIENT)
Dept: PULMONOLOGY | Facility: HOSPITAL | Age: 65
End: 2025-09-04
Payer: COMMERCIAL

## 2025-09-04 DIAGNOSIS — R06.02 SHORTNESS OF BREATH: ICD-10-CM

## 2025-09-04 PROCEDURE — 71046 X-RAY EXAM CHEST 2 VIEWS: CPT | Performed by: RADIOLOGY

## 2025-09-04 PROCEDURE — 71046 X-RAY EXAM CHEST 2 VIEWS: CPT

## 2026-01-22 ENCOUNTER — APPOINTMENT (OUTPATIENT)
Dept: PRIMARY CARE | Facility: CLINIC | Age: 66
End: 2026-01-22
Payer: COMMERCIAL

## 2026-01-27 ENCOUNTER — APPOINTMENT (OUTPATIENT)
Dept: PULMONOLOGY | Facility: HOSPITAL | Age: 66
End: 2026-01-27
Payer: COMMERCIAL

## 2026-03-10 ENCOUNTER — APPOINTMENT (OUTPATIENT)
Dept: PULMONOLOGY | Facility: HOSPITAL | Age: 66
End: 2026-03-10
Payer: COMMERCIAL